# Patient Record
Sex: FEMALE | HISPANIC OR LATINO | Employment: FULL TIME | URBAN - METROPOLITAN AREA
[De-identification: names, ages, dates, MRNs, and addresses within clinical notes are randomized per-mention and may not be internally consistent; named-entity substitution may affect disease eponyms.]

---

## 2018-07-30 ENCOUNTER — TELEPHONE (OUTPATIENT)
Dept: MEDICAL GROUP | Age: 28
End: 2018-07-30

## 2018-07-30 NOTE — TELEPHONE ENCOUNTER
Phone Number Called: There are no phone numbers on file.      Message: LM for patient to call the office to reschedule NP appointment as Jenny will be out of the office on 8/10/18.     Left Message for patient to call back: yes

## 2018-07-30 NOTE — TELEPHONE ENCOUNTER
Phone Number Called: There are no phone numbers on file.      Message: Disregard previous message.    Left Message for patient to call back: no

## 2018-08-09 ENCOUNTER — TELEPHONE (OUTPATIENT)
Dept: MEDICAL GROUP | Age: 28
End: 2018-08-09

## 2018-08-09 NOTE — TELEPHONE ENCOUNTER
NEW PATIENT VISIT PRE-VISIT [id number]  1.  [employer] Patient is checked in Patient Demographics? YES    2.  Immunizations were updated in Epic using WebIZ?: Epic matches WebIZ       •  Web Iz Recommendations: Patient is up to date on all vaccines    3.  Is this appointment scheduled as a Hospital Follow-Up? No    4.  Patient is due for the following Health Maintenance Topics:   Health Maintenance Due   Topic Date Due   • PAP SMEAR  05/02/2016           5.  Reviewed/Updated the following with patient:   •   Preferred Pharmacy? YES       •   Preferred Lab? YES       •   Preferred Communication? YES       •   Allergies? YES       •   Medications? YES. Was Abstract Encounter opened and chart updated? YES       •   Social History? YES. Was Abstract Encounter opened and chart updated? YES       •   Family History (document living status of immediate family members and if + hx of cancer, diabetes, hypertension, hyperlipidemia, heart attack, stroke) YES. Was Abstract Encounter opened and chart updated? YES    6.  Updated Care Team?       •   DME Company (gait device, O2, CPAP, etc.) N\A       •   Other Specialists (eye doctor, derm, GYN, cardiology, endo, etc): N\A    7.  MDX printed for Provider? NO    8.  Patient was informed to arrive 15 min prior to their   scheduled appointment and bring in their medication bottles.

## 2018-08-10 ENCOUNTER — OFFICE VISIT (OUTPATIENT)
Dept: MEDICAL GROUP | Age: 28
End: 2018-08-10
Payer: COMMERCIAL

## 2018-08-10 VITALS
HEART RATE: 94 BPM | WEIGHT: 137.6 LBS | DIASTOLIC BLOOD PRESSURE: 58 MMHG | TEMPERATURE: 98.1 F | SYSTOLIC BLOOD PRESSURE: 100 MMHG | OXYGEN SATURATION: 99 % | BODY MASS INDEX: 24.38 KG/M2 | HEIGHT: 63 IN

## 2018-08-10 DIAGNOSIS — Z76.89 ENCOUNTER TO ESTABLISH CARE: ICD-10-CM

## 2018-08-10 DIAGNOSIS — J30.1 SEASONAL ALLERGIC RHINITIS DUE TO POLLEN: ICD-10-CM

## 2018-08-10 DIAGNOSIS — Z00.00 WELL ADULT EXAM: ICD-10-CM

## 2018-08-10 PROCEDURE — 99203 OFFICE O/P NEW LOW 30 MIN: CPT | Performed by: PHYSICIAN ASSISTANT

## 2018-08-10 ASSESSMENT — PATIENT HEALTH QUESTIONNAIRE - PHQ9: CLINICAL INTERPRETATION OF PHQ2 SCORE: 0

## 2018-08-10 NOTE — LETTER
[facility]  Jenny Fleming P.A.-C.  [address]  [place] [address]  Fax: [phone]   Authorization for Release/Disclosure of   Protected Health Information   Name: JENA TOMLINSON : 1990 SSN: [id number]   Address: [address] [place] [address] Dr  [place] [address] Phone:    [phone] (home)    I authorize the entity listed below to release/disclose the PHI below to:   [facility]/Jenny Fleming P.A.-C. and Jenny Fleming P.A.-C.   Provider or Entity Name:  Dr. Alfredo Zamudio   Address   City, [facility], [place], [place]   Phone:      Fax:     Reason for request: continuity of care   Information to be released:    [  ] LAST COLONOSCOPY,  including any PATH REPORT and follow-up  [  ] LAST FIT/COLOGUARD RESULT [  ] LAST DEXA  [  ] LAST MAMMOGRAM  [x ] LAST PAP  [  ] LAST LABS [  ] RETINA EXAM REPORT  [  ] IMMUNIZATION RECORDS  [  ] Release all info      [  ] Check here and initial the line next to each item to release ALL health information INCLUDING  _____ Care and treatment for drug and / or alcohol abuse  _____ HIV testing, infection status, or AIDS  _____ Genetic Testing    DATES OF SERVICE OR TIME PERIOD TO BE DISCLOSED: _____________  I understand and acknowledge that:  * This Authorization may be revoked at any time by you in writing, except if your health information has already been used or disclosed.  * Your health information that will be used or disclosed as a result of you signing this authorization could be re-disclosed by the recipient. If this occurs, your re-disclosed health information may no longer be protected by State or Federal laws.  * You may refuse to sign this Authorization. Your refusal will not affect your ability to obtain treatment.  * This Authorization becomes effective upon signing and will  on (date) __________.      If no date is indicated, this Authorization will  one (1) year from the signature date.    Name: Jena Brush  Juvencio    Signature:   Date:     8/10/2018       PLEASE FAX REQUESTED RECORDS BACK TO: [phone]

## 2018-08-10 NOTE — ASSESSMENT & PLAN NOTE
This is a chronic problem.  The patient uses OTC zyrtec to manage her symptoms.  Her most bothersome symptoms are rhinorrhea, sneezing, and itchy eyes.  She is bothered by the fact that the Zyrtec dries out her nose, so she uses a humidifier at night.
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
## 2018-08-18 ENCOUNTER — HOSPITAL ENCOUNTER (OUTPATIENT)
Dept: LAB | Facility: MEDICAL CENTER | Age: 28
End: 2018-08-18
Attending: PHYSICIAN ASSISTANT
Payer: COMMERCIAL

## 2018-08-18 DIAGNOSIS — Z00.00 WELL ADULT EXAM: ICD-10-CM

## 2018-08-18 LAB
ALBUMIN SERPL BCP-MCNC: 3.9 G/DL (ref 3.2–4.9)
ALBUMIN/GLOB SERPL: 1.3 G/DL
ALP SERPL-CCNC: 51 U/L (ref 30–99)
ALT SERPL-CCNC: 11 U/L (ref 2–50)
ANION GAP SERPL CALC-SCNC: 8 MMOL/L (ref 0–11.9)
AST SERPL-CCNC: 12 U/L (ref 12–45)
BASOPHILS # BLD AUTO: 0.7 % (ref 0–1.8)
BASOPHILS # BLD: 0.05 K/UL (ref 0–0.12)
BILIRUB SERPL-MCNC: 0.6 MG/DL (ref 0.1–1.5)
BUN SERPL-MCNC: 13 MG/DL (ref 8–22)
CALCIUM SERPL-MCNC: 8.7 MG/DL (ref 8.5–10.5)
CHLORIDE SERPL-SCNC: 108 MMOL/L (ref 96–112)
CHOLEST SERPL-MCNC: 146 MG/DL (ref 100–199)
CO2 SERPL-SCNC: 26 MMOL/L (ref 20–33)
CREAT SERPL-MCNC: 0.73 MG/DL (ref 0.5–1.4)
EOSINOPHIL # BLD AUTO: 0.18 K/UL (ref 0–0.51)
EOSINOPHIL NFR BLD: 2.4 % (ref 0–6.9)
ERYTHROCYTE [DISTWIDTH] IN BLOOD BY AUTOMATED COUNT: 41.5 FL (ref 35.9–50)
GLOBULIN SER CALC-MCNC: 2.9 G/DL (ref 1.9–3.5)
GLUCOSE SERPL-MCNC: 83 MG/DL (ref 65–99)
HCT VFR BLD AUTO: 41.9 % (ref 37–47)
HDLC SERPL-MCNC: 44 MG/DL
HGB BLD-MCNC: 13.6 G/DL (ref 12–16)
IMM GRANULOCYTES # BLD AUTO: 0.01 K/UL (ref 0–0.11)
IMM GRANULOCYTES NFR BLD AUTO: 0.1 % (ref 0–0.9)
LDLC SERPL CALC-MCNC: 89 MG/DL
LYMPHOCYTES # BLD AUTO: 2.84 K/UL (ref 1–4.8)
LYMPHOCYTES NFR BLD: 38.4 % (ref 22–41)
MCH RBC QN AUTO: 28.9 PG (ref 27–33)
MCHC RBC AUTO-ENTMCNC: 32.5 G/DL (ref 33.6–35)
MCV RBC AUTO: 89.1 FL (ref 81.4–97.8)
MONOCYTES # BLD AUTO: 0.5 K/UL (ref 0–0.85)
MONOCYTES NFR BLD AUTO: 6.8 % (ref 0–13.4)
NEUTROPHILS # BLD AUTO: 3.81 K/UL (ref 2–7.15)
NEUTROPHILS NFR BLD: 51.6 % (ref 44–72)
NRBC # BLD AUTO: 0 K/UL
NRBC BLD-RTO: 0 /100 WBC
PLATELET # BLD AUTO: 233 K/UL (ref 164–446)
PMV BLD AUTO: 10 FL (ref 9–12.9)
POTASSIUM SERPL-SCNC: 3.9 MMOL/L (ref 3.6–5.5)
PROT SERPL-MCNC: 6.8 G/DL (ref 6–8.2)
RBC # BLD AUTO: 4.7 M/UL (ref 4.2–5.4)
SODIUM SERPL-SCNC: 142 MMOL/L (ref 135–145)
TRIGL SERPL-MCNC: 64 MG/DL (ref 0–149)
WBC # BLD AUTO: 7.4 K/UL (ref 4.8–10.8)

## 2018-08-18 PROCEDURE — 80061 LIPID PANEL: CPT

## 2018-08-18 PROCEDURE — 85025 COMPLETE CBC W/AUTO DIFF WBC: CPT

## 2018-08-18 PROCEDURE — 80053 COMPREHEN METABOLIC PANEL: CPT

## 2018-08-18 PROCEDURE — 36415 COLL VENOUS BLD VENIPUNCTURE: CPT

## 2018-08-31 ENCOUNTER — OFFICE VISIT (OUTPATIENT)
Dept: MEDICAL GROUP | Age: 28
End: 2018-08-31
Payer: COMMERCIAL

## 2018-08-31 ENCOUNTER — HOSPITAL ENCOUNTER (OUTPATIENT)
Facility: MEDICAL CENTER | Age: 28
End: 2018-08-31
Attending: PHYSICIAN ASSISTANT
Payer: COMMERCIAL

## 2018-08-31 VITALS
DIASTOLIC BLOOD PRESSURE: 58 MMHG | OXYGEN SATURATION: 98 % | SYSTOLIC BLOOD PRESSURE: 106 MMHG | WEIGHT: 135.6 LBS | BODY MASS INDEX: 24.03 KG/M2 | HEART RATE: 98 BPM | HEIGHT: 63 IN | TEMPERATURE: 98.7 F

## 2018-08-31 DIAGNOSIS — R30.0 DYSURIA: ICD-10-CM

## 2018-08-31 DIAGNOSIS — Z01.419 WELL FEMALE EXAM WITH ROUTINE GYNECOLOGICAL EXAM: ICD-10-CM

## 2018-08-31 DIAGNOSIS — Z12.4 PAP SMEAR FOR CERVICAL CANCER SCREENING: ICD-10-CM

## 2018-08-31 DIAGNOSIS — R39.89 ABNORMAL UROGENITAL DISCHARGE: ICD-10-CM

## 2018-08-31 PROCEDURE — 99395 PREV VISIT EST AGE 18-39: CPT | Performed by: PHYSICIAN ASSISTANT

## 2018-08-31 PROCEDURE — 88175 CYTOPATH C/V AUTO FLUID REDO: CPT

## 2018-08-31 PROCEDURE — 99000 SPECIMEN HANDLING OFFICE-LAB: CPT | Performed by: PHYSICIAN ASSISTANT

## 2018-08-31 NOTE — PROGRESS NOTES
"S: Jena Henning is a 27 y.o.,female who presents today for her Pap and GYN exam. Her LMP was 2018. She is still having regular menses. Her form of contraception is tubal ligation    Abnormal urogenital discharge  This is a new problem. The patient complains of increased clear/yellowish vaginal discharge as well as an unpleasant smell of her urine. No dysuria, flank pain, hematuria, urgency or frequency.       She is , P:2.    She has not had an Abnormal Pap previously.  Her last Mammogram was done: due at 37 yo.     Her preventative health screens are up to date.  GYN ROS:  normal menses, no abnormal bleeding, pelvic pain, no breast pain or new or enlarging lumps on self exam, she complains of new abnormal discharge as in HPI.    Patient Active Problem List    Diagnosis Date Noted   • Abnormal urogenital discharge 2018   • Seasonal allergic rhinitis due to pollen 08/10/2018     No current outpatient prescriptions on file prior to visit.     No current facility-administered medications on file prior to visit.      Social History   Substance Use Topics   • Smoking status: Never Smoker   • Smokeless tobacco: Never Used   • Alcohol use Yes      Comment: occassionally       O: /58   Pulse 98   Temp 37.1 °C (98.7 °F)   Ht 1.588 m (5' 2.5\")   Wt 61.5 kg (135 lb 9.6 oz)   SpO2 98%   BMI 24.41 kg/m²   Vitals Noted and Reviewed  Vulva: grossly unremarkable, no lesions or masses noted  Vagina: yellowish discharge present, normal color  Cervix: Parous, nonfriable, no surface lesions identified, Pap was performed  Uterus: Normal shape, position and consistency, Retroverted, mobile  Bimanual exam: No uteromegaly, negative chandelier sign, adnexa freely movable and without enlargements bilaterally  Rectal: not performed    Assessment:  Abnormal GYN Exam - new unpleasant discharge  POCT UA without abnormalities today  Vaginal swab sent for pathogens, will call with results      Plan: "   pap smear  return annually or prn    Pap processed and sent to the lab.    Recommend follow up prn

## 2018-08-31 NOTE — ASSESSMENT & PLAN NOTE
This is a new problem. The patient complains of increased clear/yellowish vaginal discharge as well as an unpleasant smell of her urine. No dysuria, flank pain, hematuria, urgency or frequency.

## 2018-09-05 DIAGNOSIS — Z01.419 WELL FEMALE EXAM WITH ROUTINE GYNECOLOGICAL EXAM: ICD-10-CM

## 2018-09-05 DIAGNOSIS — Z12.4 PAP SMEAR FOR CERVICAL CANCER SCREENING: ICD-10-CM

## 2018-09-05 LAB — CYTOLOGY REG CYTOL: NORMAL

## 2018-09-10 PROBLEM — R87.619 ABNORMAL PAP SMEAR OF CERVIX: Status: ACTIVE | Noted: 2018-09-10

## 2018-12-18 ENCOUNTER — OFFICE VISIT (OUTPATIENT)
Dept: URGENT CARE | Facility: CLINIC | Age: 28
End: 2018-12-18
Payer: COMMERCIAL

## 2018-12-18 VITALS
DIASTOLIC BLOOD PRESSURE: 70 MMHG | SYSTOLIC BLOOD PRESSURE: 102 MMHG | HEIGHT: 62 IN | HEART RATE: 90 BPM | RESPIRATION RATE: 16 BRPM | BODY MASS INDEX: 24.66 KG/M2 | OXYGEN SATURATION: 95 % | TEMPERATURE: 98 F | WEIGHT: 134 LBS

## 2018-12-18 DIAGNOSIS — R11.0 NAUSEA: ICD-10-CM

## 2018-12-18 DIAGNOSIS — J40 BRONCHITIS: Primary | ICD-10-CM

## 2018-12-18 DIAGNOSIS — J06.9 URI WITH COUGH AND CONGESTION: ICD-10-CM

## 2018-12-18 DIAGNOSIS — J01.40 ACUTE NON-RECURRENT PANSINUSITIS: ICD-10-CM

## 2018-12-18 LAB
FLUAV+FLUBV AG SPEC QL IA: NORMAL
INT CON NEG: NORMAL
INT CON POS: NORMAL

## 2018-12-18 PROCEDURE — 99214 OFFICE O/P EST MOD 30 MIN: CPT | Performed by: PHYSICIAN ASSISTANT

## 2018-12-18 PROCEDURE — 87804 INFLUENZA ASSAY W/OPTIC: CPT | Performed by: PHYSICIAN ASSISTANT

## 2018-12-18 RX ORDER — AMOXICILLIN AND CLAVULANATE POTASSIUM 875; 125 MG/1; MG/1
1 TABLET, FILM COATED ORAL 2 TIMES DAILY
Qty: 14 TAB | Refills: 0 | Status: SHIPPED | OUTPATIENT
Start: 2018-12-18 | End: 2018-12-25

## 2018-12-18 RX ORDER — ONDANSETRON 4 MG/1
8 TABLET, ORALLY DISINTEGRATING ORAL ONCE
Status: COMPLETED | OUTPATIENT
Start: 2018-12-18 | End: 2018-12-18

## 2018-12-18 RX ORDER — ONDANSETRON 4 MG/1
4 TABLET, ORALLY DISINTEGRATING ORAL EVERY 8 HOURS PRN
Qty: 10 TAB | Refills: 0 | Status: SHIPPED | OUTPATIENT
Start: 2018-12-18 | End: 2018-12-21

## 2018-12-18 RX ORDER — PROMETHAZINE HYDROCHLORIDE AND CODEINE PHOSPHATE 6.25; 1 MG/5ML; MG/5ML
5 SYRUP ORAL 4 TIMES DAILY PRN
Qty: 120 ML | Refills: 0 | Status: SHIPPED | OUTPATIENT
Start: 2018-12-18 | End: 2018-12-25

## 2018-12-18 RX ORDER — METHYLPREDNISOLONE 4 MG/1
TABLET ORAL
Qty: 21 TAB | Refills: 0 | Status: SHIPPED | OUTPATIENT
Start: 2018-12-18 | End: 2019-07-02

## 2018-12-18 RX ADMIN — ONDANSETRON 8 MG: 4 TABLET, ORALLY DISINTEGRATING ORAL at 11:03
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
## 2018-12-18 NOTE — PATIENT INSTRUCTIONS
Enterovirus D68, Adult  Enterovirus D68 is a common virus that causes a fever, cough, and nasal congestion (upper respiratory infection).  What are the causes?  Enterovirus D68 spreads from person to person through coughing and sneezing. The virus is present in the fluid of an infected person's lungs (upper respiratory secretions). When a sick person coughs or sneezes, particles get released into the air. You can get infected by breathing in those particles. The virus may also be on any surface where these particles land. You can get infected by touching that surface and then touching your nose or mouth.  What increases the risk?  This condition is more likely to develop in:  · People who have a chronic disease, such as chronic obstructive pulmonary disease (COPD), asthma, congestive heart failure, cystic fibrosis, or diabetes.  · People who have a weakened immune system (are immunocompromised).  What are the signs or symptoms?  For most adults, symptoms of this condition are mild. Symptoms include:  · Fever.  · Nasal congestion.  · Sneezing.  · Muscle aches.  · Cough.  · Wheezing.  · Trouble breathing.  How is this diagnosed?  This condition is diagnosed based on your symptoms and a physical exam. You may also have a chest X-ray.  How is this treated?  There is no treatment or vaccine for this infection. Most people recover after resting at home for several days.  Follow these instructions at home:  · Take over-the-counter and prescription medicines only as told by your health care provider.  · Rest at home until symptoms go away. Do not go to work while you have symptoms.  · Wash your hands often with soap and water for at least 20 seconds. If soap and water are not available, use hand .  · Drink enough fluid to keep your urine clear or pale yellow.  · Keep all follow-up visits as told by your health care provider. This is important.  Contact a health care provider if:  · You have a fever.  · You have  nausea or vomiting.  · Your symptoms last longer than 3 days.  Get help right away if:  · You develop wheezing.  · You have trouble breathing.  · You cannot keep fluids down.  This information is not intended to replace advice given to you by your health care provider. Make sure you discuss any questions you have with your health care provider.  Document Released: 12/23/2014 Document Revised: 07/07/2017 Document Reviewed: 04/06/2017  [employer] Interactive Patient Education © 2017 [employer]

## 2018-12-18 NOTE — LETTER
December 18, 2018       Patient: Jena Henning   YOB: 1990   Date of Visit: 12/18/2018         To Whom It May Concern:    It is my medical opinion that Jena Henning may be excused from work for the dates of 12/18/18-12/23/18.      If you have any questions or concerns, please don't hesitate to call [phone]          Sincerely,          Dilan Wilkes P.A.-C.  Electronically Signed

## 2019-05-06 ENCOUNTER — TELEPHONE (OUTPATIENT)
Dept: SCHEDULING | Facility: IMAGING CENTER | Age: 29
End: 2019-05-06

## 2019-05-08 ENCOUNTER — TELEPHONE (OUTPATIENT)
Dept: SCHEDULING | Facility: IMAGING CENTER | Age: 29
End: 2019-05-08

## 2019-07-02 ENCOUNTER — OFFICE VISIT (OUTPATIENT)
Dept: MEDICAL GROUP | Facility: MEDICAL CENTER | Age: 29
End: 2019-07-02
Payer: COMMERCIAL

## 2019-07-02 VITALS
RESPIRATION RATE: 16 BRPM | HEIGHT: 62 IN | TEMPERATURE: 98.5 F | SYSTOLIC BLOOD PRESSURE: 104 MMHG | DIASTOLIC BLOOD PRESSURE: 72 MMHG | OXYGEN SATURATION: 99 % | BODY MASS INDEX: 25.56 KG/M2 | HEART RATE: 97 BPM | WEIGHT: 138.89 LBS

## 2019-07-02 DIAGNOSIS — Z76.89 ENCOUNTER TO ESTABLISH CARE: ICD-10-CM

## 2019-07-02 DIAGNOSIS — J30.1 SEASONAL ALLERGIC RHINITIS DUE TO POLLEN: ICD-10-CM

## 2019-07-02 DIAGNOSIS — R87.619 ABNORMAL CERVICAL PAPANICOLAOU SMEAR, UNSPECIFIED ABNORMAL PAP FINDING: ICD-10-CM

## 2019-07-02 DIAGNOSIS — Z00.00 PREVENTATIVE HEALTH CARE: ICD-10-CM

## 2019-07-02 PROBLEM — R39.89 ABNORMAL UROGENITAL DISCHARGE: Status: RESOLVED | Noted: 2018-08-31 | Resolved: 2019-07-02

## 2019-07-02 PROCEDURE — 99214 OFFICE O/P EST MOD 30 MIN: CPT | Performed by: PHYSICIAN ASSISTANT

## 2019-07-02 ASSESSMENT — PATIENT HEALTH QUESTIONNAIRE - PHQ9: CLINICAL INTERPRETATION OF PHQ2 SCORE: 0
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
## 2019-07-02 NOTE — ASSESSMENT & PLAN NOTE
This is a 28-year-old female who is here to care.  Has a chronic history of seasonal allergies.  Symptoms are not too bad.  She does not take medication routinely for her symptoms.

## 2019-07-02 NOTE — ASSESSMENT & PLAN NOTE
Had a Pap smear performed in August of last year.  Noted to have atypical cells.  Was advised to follow-up in 1 year to have a repeat Pap smear.  She has no concerns today with any vaginal complaints.    She does have a form that needs to be completed eventually through her work.  She did not bring the form today.  She is requesting some labs.

## 2019-08-31 ENCOUNTER — HOSPITAL ENCOUNTER (OUTPATIENT)
Dept: LAB | Facility: MEDICAL CENTER | Age: 29
End: 2019-08-31
Attending: PHYSICIAN ASSISTANT
Payer: COMMERCIAL

## 2019-08-31 DIAGNOSIS — Z00.00 PREVENTATIVE HEALTH CARE: ICD-10-CM

## 2019-08-31 LAB
ALBUMIN SERPL BCP-MCNC: 4.1 G/DL (ref 3.2–4.9)
ALBUMIN/GLOB SERPL: 1.5 G/DL
ALP SERPL-CCNC: 54 U/L (ref 30–99)
ALT SERPL-CCNC: 10 U/L (ref 2–50)
ANION GAP SERPL CALC-SCNC: 7 MMOL/L (ref 0–11.9)
AST SERPL-CCNC: 12 U/L (ref 12–45)
BASOPHILS # BLD AUTO: 0.7 % (ref 0–1.8)
BASOPHILS # BLD: 0.05 K/UL (ref 0–0.12)
BILIRUB SERPL-MCNC: 1.1 MG/DL (ref 0.1–1.5)
BUN SERPL-MCNC: 9 MG/DL (ref 8–22)
CALCIUM SERPL-MCNC: 9 MG/DL (ref 8.5–10.5)
CHLORIDE SERPL-SCNC: 107 MMOL/L (ref 96–112)
CHOLEST SERPL-MCNC: 154 MG/DL (ref 100–199)
CO2 SERPL-SCNC: 27 MMOL/L (ref 20–33)
CREAT SERPL-MCNC: 0.61 MG/DL (ref 0.5–1.4)
EOSINOPHIL # BLD AUTO: 0.11 K/UL (ref 0–0.51)
EOSINOPHIL NFR BLD: 1.6 % (ref 0–6.9)
ERYTHROCYTE [DISTWIDTH] IN BLOOD BY AUTOMATED COUNT: 43.4 FL (ref 35.9–50)
EST. AVERAGE GLUCOSE BLD GHB EST-MCNC: 105 MG/DL
GLOBULIN SER CALC-MCNC: 2.8 G/DL (ref 1.9–3.5)
GLUCOSE SERPL-MCNC: 87 MG/DL (ref 65–99)
HBA1C MFR BLD: 5.3 % (ref 0–5.6)
HCT VFR BLD AUTO: 44.4 % (ref 37–47)
HDLC SERPL-MCNC: 47 MG/DL
HGB BLD-MCNC: 13.8 G/DL (ref 12–16)
IMM GRANULOCYTES # BLD AUTO: 0.01 K/UL (ref 0–0.11)
IMM GRANULOCYTES NFR BLD AUTO: 0.1 % (ref 0–0.9)
LDLC SERPL CALC-MCNC: 90 MG/DL
LYMPHOCYTES # BLD AUTO: 2.14 K/UL (ref 1–4.8)
LYMPHOCYTES NFR BLD: 31.8 % (ref 22–41)
MCH RBC QN AUTO: 28.8 PG (ref 27–33)
MCHC RBC AUTO-ENTMCNC: 31.1 G/DL (ref 33.6–35)
MCV RBC AUTO: 92.7 FL (ref 81.4–97.8)
MONOCYTES # BLD AUTO: 0.48 K/UL (ref 0–0.85)
MONOCYTES NFR BLD AUTO: 7.1 % (ref 0–13.4)
NEUTROPHILS # BLD AUTO: 3.93 K/UL (ref 2–7.15)
NEUTROPHILS NFR BLD: 58.7 % (ref 44–72)
NRBC # BLD AUTO: 0 K/UL
NRBC BLD-RTO: 0 /100 WBC
PLATELET # BLD AUTO: 226 K/UL (ref 164–446)
PMV BLD AUTO: 9.9 FL (ref 9–12.9)
POTASSIUM SERPL-SCNC: 3.8 MMOL/L (ref 3.6–5.5)
PROT SERPL-MCNC: 6.9 G/DL (ref 6–8.2)
RBC # BLD AUTO: 4.79 M/UL (ref 4.2–5.4)
SODIUM SERPL-SCNC: 141 MMOL/L (ref 135–145)
TRIGL SERPL-MCNC: 83 MG/DL (ref 0–149)
WBC # BLD AUTO: 6.7 K/UL (ref 4.8–10.8)

## 2019-08-31 PROCEDURE — 80053 COMPREHEN METABOLIC PANEL: CPT

## 2019-08-31 PROCEDURE — 80061 LIPID PANEL: CPT

## 2019-08-31 PROCEDURE — 85025 COMPLETE CBC W/AUTO DIFF WBC: CPT

## 2019-08-31 PROCEDURE — 36415 COLL VENOUS BLD VENIPUNCTURE: CPT

## 2019-08-31 PROCEDURE — 83036 HEMOGLOBIN GLYCOSYLATED A1C: CPT

## 2019-09-24 ENCOUNTER — APPOINTMENT (OUTPATIENT)
Dept: MEDICAL GROUP | Facility: MEDICAL CENTER | Age: 29
End: 2019-09-24
Payer: COMMERCIAL

## 2019-10-21 ENCOUNTER — OFFICE VISIT (OUTPATIENT)
Dept: MEDICAL GROUP | Facility: MEDICAL CENTER | Age: 29
End: 2019-10-21
Payer: COMMERCIAL

## 2019-10-21 VITALS
BODY MASS INDEX: 26.13 KG/M2 | HEART RATE: 88 BPM | WEIGHT: 142 LBS | OXYGEN SATURATION: 96 % | HEIGHT: 62 IN | RESPIRATION RATE: 16 BRPM | DIASTOLIC BLOOD PRESSURE: 68 MMHG | SYSTOLIC BLOOD PRESSURE: 110 MMHG | TEMPERATURE: 99 F

## 2019-10-21 DIAGNOSIS — Z00.00 ANNUAL PHYSICAL EXAM: ICD-10-CM

## 2019-10-21 PROCEDURE — 99395 PREV VISIT EST AGE 18-39: CPT | Performed by: PHYSICIAN ASSISTANT

## 2019-10-21 NOTE — ASSESSMENT & PLAN NOTE
This is a 29-year-old female who is here today for a physical.  Has a form which is simple to complete.  Requires vitals and some labs.  Labs were done at the end of August.  They were normal ranges.  Requesting glucose as well as cholesterol.  She is overdue for her Pap smear.  Last Pap smear was abnormal.  She follows with a gynecologist outside of [facility].  She will plan on making an appointment in the near future.  No new complaints today.  Went to [place] in [place] over the summer.
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
## 2020-06-22 ENCOUNTER — TELEPHONE (OUTPATIENT)
Dept: HEALTH INFORMATION MANAGEMENT | Facility: OTHER | Age: 30
End: 2020-06-22

## 2020-06-22 NOTE — TELEPHONE ENCOUNTER
1. Caller Name: self                Call Back Number: cell  [facility] PCP or Specialty Provider: Yes Jose Hawkins        2.  In the last two weeks, has the patient had any new or worsening symptoms (not explained by alternative diagnosis)? Yes, the patient reports the following COVID-19 consistent symptoms: cough, sore throat, congestion or runny nose, fatigue, headache, nausea and symptoms about 2 days.    3.  Does patient have any comoribidities? None     4.  Has the patient traveled in the last 14 days OR had any known contact with someone who is suspected or confirmed to have COVID-19?  Yes, place of employment positive    5. POTENTIAL PUI (LOW): Advised to perform self care, monitor for worsening symptoms and to call back in 3 days if no improvement. Instructed to self isolate and contact [facility] at [phone]     patient had incorrect number for health department, updated patient that she could either get an order from MD or employer to get done at our urgent care or call [facility]. Patient was irate for being transferred to triage line and hung up.    Note routed to [facility] Provider: PIERCE only.

## 2020-06-23 DIAGNOSIS — Z00.00 PREVENTATIVE HEALTH CARE: ICD-10-CM

## 2020-11-09 ENCOUNTER — HOSPITAL ENCOUNTER (OUTPATIENT)
Dept: LAB | Facility: MEDICAL CENTER | Age: 30
End: 2020-11-09
Attending: PHYSICIAN ASSISTANT
Payer: COMMERCIAL

## 2020-11-09 DIAGNOSIS — Z20.822 CLOSE EXPOSURE TO COVID-19 VIRUS: ICD-10-CM

## 2020-11-09 PROCEDURE — U0003 INFECTIOUS AGENT DETECTION BY NUCLEIC ACID (DNA OR RNA); SEVERE ACUTE RESPIRATORY SYNDROME CORONAVIRUS 2 (SARS-COV-2) (CORONAVIRUS DISEASE [COVID-19]), AMPLIFIED PROBE TECHNIQUE, MAKING USE OF HIGH THROUGHPUT TECHNOLOGIES AS DESCRIBED BY CMS-2020-01-R: HCPCS

## 2020-11-09 PROCEDURE — C9803 HOPD COVID-19 SPEC COLLECT: HCPCS

## 2020-11-10 ENCOUNTER — APPOINTMENT (OUTPATIENT)
Dept: MEDICAL GROUP | Facility: MEDICAL CENTER | Age: 30
End: 2020-11-10
Payer: COMMERCIAL

## 2020-11-10 LAB
COVID ORDER STATUS COVID19: NORMAL
SARS-COV-2 RNA RESP QL NAA+PROBE: NOTDETECTED
SPECIMEN SOURCE: NORMAL

## 2021-12-14 ENCOUNTER — HOSPITAL ENCOUNTER (OUTPATIENT)
Facility: MEDICAL CENTER | Age: 31
End: 2021-12-14
Attending: NURSE PRACTITIONER
Payer: COMMERCIAL

## 2021-12-14 ENCOUNTER — OFFICE VISIT (OUTPATIENT)
Dept: URGENT CARE | Facility: PHYSICIAN GROUP | Age: 31
End: 2021-12-14
Payer: COMMERCIAL

## 2021-12-14 VITALS
WEIGHT: 135 LBS | DIASTOLIC BLOOD PRESSURE: 80 MMHG | SYSTOLIC BLOOD PRESSURE: 108 MMHG | BODY MASS INDEX: 24.84 KG/M2 | HEIGHT: 62 IN | RESPIRATION RATE: 14 BRPM | HEART RATE: 100 BPM | OXYGEN SATURATION: 100 % | TEMPERATURE: 99.2 F

## 2021-12-14 DIAGNOSIS — R13.10 PAIN WITH SWALLOWING: ICD-10-CM

## 2021-12-14 DIAGNOSIS — J02.9 SORE THROAT: ICD-10-CM

## 2021-12-14 DIAGNOSIS — G44.89 OTHER HEADACHE SYNDROME: ICD-10-CM

## 2021-12-14 DIAGNOSIS — R50.9 LOW GRADE FEVER: ICD-10-CM

## 2021-12-14 DIAGNOSIS — H57.13 EYE PAIN, BILATERAL: ICD-10-CM

## 2021-12-14 LAB
INT CON NEG: NEGATIVE
INT CON POS: NORMAL
S PYO AG THROAT QL: NORMAL

## 2021-12-14 PROCEDURE — 99213 OFFICE O/P EST LOW 20 MIN: CPT | Performed by: NURSE PRACTITIONER

## 2021-12-14 PROCEDURE — 87880 STREP A ASSAY W/OPTIC: CPT | Performed by: NURSE PRACTITIONER

## 2021-12-14 PROCEDURE — 87077 CULTURE AEROBIC IDENTIFY: CPT

## 2021-12-14 PROCEDURE — 87070 CULTURE OTHR SPECIMN AEROBIC: CPT

## 2021-12-14 RX ORDER — IBUPROFEN 200 MG
200 TABLET ORAL EVERY 6 HOURS PRN
COMMUNITY
End: 2023-03-07

## 2021-12-14 ASSESSMENT — ENCOUNTER SYMPTOMS
RESPIRATORY NEGATIVE: 1
EYE PAIN: 1
PSYCHIATRIC NEGATIVE: 1
CARDIOVASCULAR NEGATIVE: 1
CONSTITUTIONAL NEGATIVE: 1
HEADACHES: 1
SORE THROAT: 1
MUSCULOSKELETAL NEGATIVE: 1
GASTROINTESTINAL NEGATIVE: 1

## 2021-12-15 DIAGNOSIS — R13.10 PAIN WITH SWALLOWING: ICD-10-CM

## 2021-12-15 DIAGNOSIS — G44.89 OTHER HEADACHE SYNDROME: ICD-10-CM

## 2021-12-15 DIAGNOSIS — J02.9 SORE THROAT: ICD-10-CM

## 2021-12-15 DIAGNOSIS — R50.9 LOW GRADE FEVER: ICD-10-CM

## 2021-12-15 DIAGNOSIS — H57.13 EYE PAIN, BILATERAL: ICD-10-CM

## 2021-12-15 NOTE — PROGRESS NOTES
"Subjective:   Jena Henning is a 31 y.o. female who presents for Oral Swelling (difficulty swallowing, headaches, x 3 days)       HPI  Pt presents for evaluation of a new problem, reports a 3-day history of difficulty swallowing, feeling as though is a piece of bread stuck in her throat, headache, eye pressure and pain, and ear fullness. She has not tried anything for her symptoms. Patient has a history of allergic rhinitis, does not take allergy medicines on a daily basis. Patient denies any known ill contacts, reports having Covid in August. Is not Covid vaccinated.      Review of Systems   Constitutional: Negative.    HENT: Positive for congestion and sore throat.    Eyes: Positive for pain.   Respiratory: Negative.    Cardiovascular: Negative.    Gastrointestinal: Negative.    Genitourinary: Negative.    Musculoskeletal: Negative.    Skin: Negative.    Neurological: Positive for headaches.   Endo/Heme/Allergies: Positive for environmental allergies.   Psychiatric/Behavioral: Negative.    All other systems reviewed and are negative.      MEDS:   Current Outpatient Medications:   •  ibuprofen (MOTRIN) 200 MG Tab, Take 200 mg by mouth every 6 hours as needed., Disp: , Rfl:   ALLERGIES: No Known Allergies    Patient's PMH, SocHx, SurgHx, FamHx, Drug allergies and medications were reviewed.     Objective:   /80   Pulse 100   Temp 37.3 °C (99.2 °F)   Resp 14   Ht 1.575 m (5' 2\")   Wt 61.2 kg (135 lb)   SpO2 100%   BMI 24.69 kg/m²     Physical Exam  Vitals and nursing note reviewed.   Constitutional:       General: She is awake.      Appearance: Normal appearance. She is well-developed and normal weight.   HENT:      Head: Normocephalic and atraumatic.      Right Ear: Tympanic membrane, ear canal and external ear normal.      Left Ear: Tympanic membrane, ear canal and external ear normal.      Nose: Nose normal.      Mouth/Throat:      Lips: Pink.      Mouth: Mucous membranes are moist. "      Pharynx: Oropharynx is clear. Uvula midline. Posterior oropharyngeal erythema (mild) present.   Eyes:      Extraocular Movements: Extraocular movements intact.      Conjunctiva/sclera: Conjunctivae normal.      Pupils: Pupils are equal, round, and reactive to light.   Neck:      Thyroid: No thyromegaly.      Trachea: Trachea normal.   Cardiovascular:      Rate and Rhythm: Normal rate and regular rhythm.      Pulses: Normal pulses.      Heart sounds: Normal heart sounds, S1 normal and S2 normal.   Pulmonary:      Effort: Pulmonary effort is normal. No respiratory distress.      Breath sounds: Normal breath sounds. No wheezing, rhonchi or rales.   Abdominal:      General: Bowel sounds are normal.      Palpations: Abdomen is soft.   Musculoskeletal:         General: Normal range of motion.      Cervical back: Full passive range of motion without pain, normal range of motion and neck supple.   Lymphadenopathy:      Cervical: No cervical adenopathy.   Skin:     General: Skin is warm and dry.      Capillary Refill: Capillary refill takes less than 2 seconds.   Neurological:      General: No focal deficit present.      Mental Status: She is alert and oriented to person, place, and time.      Gait: Gait is intact.   Psychiatric:         Attention and Perception: Attention and perception normal.         Mood and Affect: Mood normal.         Speech: Speech normal.         Behavior: Behavior normal. Behavior is cooperative.         Thought Content: Thought content normal.         Judgment: Judgment normal.         Assessment/Plan:   Assessment    1. Sore throat  - POCT Rapid Strep A-  negative  - CULTURE THROAT; Future    2. Pain with swallowing  - POCT Rapid Strep A  - CULTURE THROAT; Future    3. Other headache syndrome  - POCT Rapid Strep A  - CULTURE THROAT; Future    4. Low grade fever  - POCT Rapid Strep A  - CULTURE THROAT; Future    5. Eye pain, bilateral  - POCT Rapid Strep A  - CULTURE THROAT; Future    Vital  signs stable at today's acute urgent care visit. Reviewed test results that were completed in the clinic. Will obtain throat culture. Patient's findings are not indicative of bacterial affection at this point in time. Recommend patient try Mucinex and antihistamine as well as alternating OTC NSAIDs as needed. Discussed management options (risks, benefits, and alternatives to treatment).     Advised the patient to follow-up with the primary care provider for recheck, reevaluation, and/or consideration of further management if necessary. Return to urgent care with any worsening symptoms or if there is no improvement in their current condition. Red flags discussed and indications to immediately call 911 or present to the ED.  All questions were encouraged and answered to the patient's satisfaction and understanding, and they agree to the plan of care.     I personally reviewed prior external notes and test results pertinent to today's visit.  I have independently reviewed and interpreted all diagnostics ordered during this urgent care acute visit. Time spent evaluating this patient was a minimum of 30 minutes and includes preparing for visit, counseling/education, exam, evaluation, obtaining history, and ordering lab/test/procedures.      Please note that this dictation was created using voice recognition software. I have made a reasonable attempt to correct obvious errors, but I expect that there are errors of grammar and possibly content that I did not discover before finalizing the note.

## 2021-12-17 LAB
BACTERIA SPEC RESP CULT: ABNORMAL
BACTERIA SPEC RESP CULT: ABNORMAL
SIGNIFICANT IND 70042: ABNORMAL
SITE SITE: ABNORMAL
SOURCE SOURCE: ABNORMAL

## 2023-01-18 ENCOUNTER — TELEPHONE (OUTPATIENT)
Dept: SCHEDULING | Facility: IMAGING CENTER | Age: 33
End: 2023-01-18

## 2023-01-23 SDOH — ECONOMIC STABILITY: FOOD INSECURITY: WITHIN THE PAST 12 MONTHS, THE FOOD YOU BOUGHT JUST DIDN'T LAST AND YOU DIDN'T HAVE MONEY TO GET MORE.: PATIENT DECLINED

## 2023-01-23 SDOH — ECONOMIC STABILITY: HOUSING INSECURITY
IN THE LAST 12 MONTHS, WAS THERE A TIME WHEN YOU DID NOT HAVE A STEADY PLACE TO SLEEP OR SLEPT IN A SHELTER (INCLUDING NOW)?: NO

## 2023-01-23 SDOH — ECONOMIC STABILITY: INCOME INSECURITY: HOW HARD IS IT FOR YOU TO PAY FOR THE VERY BASICS LIKE FOOD, HOUSING, MEDICAL CARE, AND HEATING?: PATIENT DECLINED

## 2023-01-23 SDOH — HEALTH STABILITY: PHYSICAL HEALTH: ON AVERAGE, HOW MANY MINUTES DO YOU ENGAGE IN EXERCISE AT THIS LEVEL?: 0 MIN

## 2023-01-23 SDOH — ECONOMIC STABILITY: FOOD INSECURITY: WITHIN THE PAST 12 MONTHS, YOU WORRIED THAT YOUR FOOD WOULD RUN OUT BEFORE YOU GOT MONEY TO BUY MORE.: PATIENT DECLINED

## 2023-01-23 SDOH — HEALTH STABILITY: PHYSICAL HEALTH: ON AVERAGE, HOW MANY DAYS PER WEEK DO YOU ENGAGE IN MODERATE TO STRENUOUS EXERCISE (LIKE A BRISK WALK)?: 0 DAYS

## 2023-01-23 SDOH — ECONOMIC STABILITY: TRANSPORTATION INSECURITY
IN THE PAST 12 MONTHS, HAS LACK OF TRANSPORTATION KEPT YOU FROM MEETINGS, WORK, OR FROM GETTING THINGS NEEDED FOR DAILY LIVING?: NO

## 2023-01-23 SDOH — HEALTH STABILITY: MENTAL HEALTH
STRESS IS WHEN SOMEONE FEELS TENSE, NERVOUS, ANXIOUS, OR CAN'T SLEEP AT NIGHT BECAUSE THEIR MIND IS TROUBLED. HOW STRESSED ARE YOU?: NOT AT ALL

## 2023-01-23 SDOH — ECONOMIC STABILITY: INCOME INSECURITY: IN THE LAST 12 MONTHS, WAS THERE A TIME WHEN YOU WERE NOT ABLE TO PAY THE MORTGAGE OR RENT ON TIME?: NO

## 2023-01-23 SDOH — ECONOMIC STABILITY: TRANSPORTATION INSECURITY
IN THE PAST 12 MONTHS, HAS THE LACK OF TRANSPORTATION KEPT YOU FROM MEDICAL APPOINTMENTS OR FROM GETTING MEDICATIONS?: NO

## 2023-01-23 SDOH — ECONOMIC STABILITY: TRANSPORTATION INSECURITY
IN THE PAST 12 MONTHS, HAS LACK OF RELIABLE TRANSPORTATION KEPT YOU FROM MEDICAL APPOINTMENTS, MEETINGS, WORK OR FROM GETTING THINGS NEEDED FOR DAILY LIVING?: NO

## 2023-01-23 SDOH — ECONOMIC STABILITY: HOUSING INSECURITY: IN THE LAST 12 MONTHS, HOW MANY PLACES HAVE YOU LIVED?: 1

## 2023-01-23 ASSESSMENT — SOCIAL DETERMINANTS OF HEALTH (SDOH)
WITHIN THE PAST 12 MONTHS, YOU WORRIED THAT YOUR FOOD WOULD RUN OUT BEFORE YOU GOT THE MONEY TO BUY MORE: PATIENT DECLINED
HOW OFTEN DO YOU ATTEND CHURCH OR RELIGIOUS SERVICES?: NEVER
HOW OFTEN DO YOU GET TOGETHER WITH FRIENDS OR RELATIVES?: ONCE A WEEK
HOW OFTEN DO YOU HAVE SIX OR MORE DRINKS ON ONE OCCASION: PATIENT DECLINED
DO YOU BELONG TO ANY CLUBS OR ORGANIZATIONS SUCH AS CHURCH GROUPS UNIONS, FRATERNAL OR ATHLETIC GROUPS, OR SCHOOL GROUPS?: NO
HOW OFTEN DO YOU ATTENT MEETINGS OF THE CLUB OR ORGANIZATION YOU BELONG TO?: NEVER
HOW OFTEN DO YOU ATTEND CHURCH OR RELIGIOUS SERVICES?: NEVER
IN A TYPICAL WEEK, HOW MANY TIMES DO YOU TALK ON THE PHONE WITH FAMILY, FRIENDS, OR NEIGHBORS?: NEVER
HOW OFTEN DO YOU GET TOGETHER WITH FRIENDS OR RELATIVES?: ONCE A WEEK
HOW HARD IS IT FOR YOU TO PAY FOR THE VERY BASICS LIKE FOOD, HOUSING, MEDICAL CARE, AND HEATING?: PATIENT DECLINED
DO YOU BELONG TO ANY CLUBS OR ORGANIZATIONS SUCH AS CHURCH GROUPS UNIONS, FRATERNAL OR ATHLETIC GROUPS, OR SCHOOL GROUPS?: NO
IN A TYPICAL WEEK, HOW MANY TIMES DO YOU TALK ON THE PHONE WITH FAMILY, FRIENDS, OR NEIGHBORS?: NEVER
HOW OFTEN DO YOU ATTENT MEETINGS OF THE CLUB OR ORGANIZATION YOU BELONG TO?: NEVER
HOW OFTEN DO YOU HAVE A DRINK CONTAINING ALCOHOL: PATIENT DECLINED
HOW MANY DRINKS CONTAINING ALCOHOL DO YOU HAVE ON A TYPICAL DAY WHEN YOU ARE DRINKING: PATIENT DECLINED

## 2023-01-23 ASSESSMENT — LIFESTYLE VARIABLES
AUDIT-C TOTAL SCORE: -1
SKIP TO QUESTIONS 9-10: 0
HOW MANY STANDARD DRINKS CONTAINING ALCOHOL DO YOU HAVE ON A TYPICAL DAY: PATIENT DECLINED
HOW OFTEN DO YOU HAVE A DRINK CONTAINING ALCOHOL: PATIENT DECLINED
HOW OFTEN DO YOU HAVE SIX OR MORE DRINKS ON ONE OCCASION: PATIENT DECLINED

## 2023-01-24 ENCOUNTER — OFFICE VISIT (OUTPATIENT)
Dept: MEDICAL GROUP | Facility: PHYSICIAN GROUP | Age: 33
End: 2023-01-24
Payer: COMMERCIAL

## 2023-01-24 ENCOUNTER — HOSPITAL ENCOUNTER (OUTPATIENT)
Facility: MEDICAL CENTER | Age: 33
End: 2023-01-24
Payer: COMMERCIAL

## 2023-01-24 VITALS
OXYGEN SATURATION: 100 % | WEIGHT: 150 LBS | DIASTOLIC BLOOD PRESSURE: 74 MMHG | SYSTOLIC BLOOD PRESSURE: 118 MMHG | BODY MASS INDEX: 27.6 KG/M2 | HEIGHT: 62 IN | RESPIRATION RATE: 18 BRPM | HEART RATE: 90 BPM | TEMPERATURE: 98.3 F

## 2023-01-24 DIAGNOSIS — G89.29 CHRONIC RIGHT-SIDED THORACIC BACK PAIN: ICD-10-CM

## 2023-01-24 DIAGNOSIS — Z11.59 NEED FOR HEPATITIS C SCREENING TEST: ICD-10-CM

## 2023-01-24 DIAGNOSIS — M54.6 CHRONIC RIGHT-SIDED THORACIC BACK PAIN: ICD-10-CM

## 2023-01-24 DIAGNOSIS — Z13.79 GENETIC SCREENING: ICD-10-CM

## 2023-01-24 DIAGNOSIS — Z11.3 SCREENING EXAMINATION FOR SEXUALLY TRANSMITTED DISEASE: ICD-10-CM

## 2023-01-24 DIAGNOSIS — Z00.00 WELLNESS EXAMINATION: ICD-10-CM

## 2023-01-24 PROCEDURE — 99213 OFFICE O/P EST LOW 20 MIN: CPT | Mod: 25

## 2023-01-24 PROCEDURE — 87591 N.GONORRHOEAE DNA AMP PROB: CPT

## 2023-01-24 PROCEDURE — 87491 CHLMYD TRACH DNA AMP PROBE: CPT

## 2023-01-24 PROCEDURE — 99395 PREV VISIT EST AGE 18-39: CPT

## 2023-01-24 ASSESSMENT — ENCOUNTER SYMPTOMS
TINGLING: 0
WEAKNESS: 0
BACK PAIN: 1

## 2023-01-24 ASSESSMENT — PATIENT HEALTH QUESTIONNAIRE - PHQ9: CLINICAL INTERPRETATION OF PHQ2 SCORE: 0
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
## 2023-01-24 NOTE — PATIENT INSTRUCTIONS
Peter Nathan:   [facility]  Dr. Fredi Lozano  [address], [address]  [place] [address]  Phone: [phone]
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
## 2023-01-25 DIAGNOSIS — Z11.3 SCREENING EXAMINATION FOR SEXUALLY TRANSMITTED DISEASE: ICD-10-CM

## 2023-01-25 LAB
C TRACH DNA GENITAL QL NAA+PROBE: POSITIVE
N GONORRHOEA DNA GENITAL QL NAA+PROBE: NEGATIVE
SPECIMEN SOURCE: ABNORMAL

## 2023-01-26 DIAGNOSIS — A74.9 CHLAMYDIA INFECTION: ICD-10-CM

## 2023-01-26 RX ORDER — DOXYCYCLINE HYCLATE 100 MG
100 TABLET ORAL 2 TIMES DAILY
Qty: 14 TABLET | Refills: 0 | Status: SHIPPED | OUTPATIENT
Start: 2023-01-26 | End: 2023-01-27 | Stop reason: SDUPTHER

## 2023-01-27 DIAGNOSIS — A74.9 CHLAMYDIA INFECTION: ICD-10-CM

## 2023-01-27 RX ORDER — DOXYCYCLINE HYCLATE 100 MG
100 TABLET ORAL 2 TIMES DAILY
Qty: 14 TABLET | Refills: 0 | Status: SHIPPED | OUTPATIENT
Start: 2023-01-27 | End: 2023-03-07

## 2023-01-31 ENCOUNTER — HOSPITAL ENCOUNTER (OUTPATIENT)
Dept: LAB | Facility: MEDICAL CENTER | Age: 33
End: 2023-01-31
Payer: COMMERCIAL

## 2023-01-31 DIAGNOSIS — Z11.59 NEED FOR HEPATITIS C SCREENING TEST: ICD-10-CM

## 2023-01-31 DIAGNOSIS — Z11.3 SCREENING EXAMINATION FOR SEXUALLY TRANSMITTED DISEASE: ICD-10-CM

## 2023-01-31 DIAGNOSIS — Z00.00 WELLNESS EXAMINATION: ICD-10-CM

## 2023-01-31 LAB
25(OH)D3 SERPL-MCNC: 17 NG/ML (ref 30–100)
ALBUMIN SERPL BCP-MCNC: 4.1 G/DL (ref 3.2–4.9)
ALBUMIN/GLOB SERPL: 1.3 G/DL
ALP SERPL-CCNC: 64 U/L (ref 30–99)
ALT SERPL-CCNC: 10 U/L (ref 2–50)
ANION GAP SERPL CALC-SCNC: 9 MMOL/L (ref 7–16)
AST SERPL-CCNC: 13 U/L (ref 12–45)
BILIRUB SERPL-MCNC: 0.9 MG/DL (ref 0.1–1.5)
BUN SERPL-MCNC: 8 MG/DL (ref 8–22)
CALCIUM ALBUM COR SERPL-MCNC: 8.8 MG/DL (ref 8.5–10.5)
CALCIUM SERPL-MCNC: 8.9 MG/DL (ref 8.5–10.5)
CHLORIDE SERPL-SCNC: 107 MMOL/L (ref 96–112)
CHOLEST SERPL-MCNC: 173 MG/DL (ref 100–199)
CO2 SERPL-SCNC: 25 MMOL/L (ref 20–33)
CREAT SERPL-MCNC: 0.55 MG/DL (ref 0.5–1.4)
ERYTHROCYTE [DISTWIDTH] IN BLOOD BY AUTOMATED COUNT: 43.8 FL (ref 35.9–50)
EST. AVERAGE GLUCOSE BLD GHB EST-MCNC: 105 MG/DL
GFR SERPLBLD CREATININE-BSD FMLA CKD-EPI: 123 ML/MIN/1.73 M 2
GLOBULIN SER CALC-MCNC: 3.2 G/DL (ref 1.9–3.5)
GLUCOSE SERPL-MCNC: 87 MG/DL (ref 65–99)
HBA1C MFR BLD: 5.3 % (ref 4–5.6)
HBV CORE AB SERPL QL IA: NONREACTIVE
HBV SURFACE AB SERPL IA-ACNC: <3.5 MIU/ML (ref 0–10)
HBV SURFACE AG SER QL: NORMAL
HCT VFR BLD AUTO: 42.6 % (ref 37–47)
HCV AB SER QL: NORMAL
HCV AB SER QL: NORMAL
HDLC SERPL-MCNC: 51 MG/DL
HGB BLD-MCNC: 13.5 G/DL (ref 12–16)
HIV 1+2 AB+HIV1 P24 AG SERPL QL IA: NORMAL
LDLC SERPL CALC-MCNC: 106 MG/DL
MCH RBC QN AUTO: 29.3 PG (ref 27–33)
MCHC RBC AUTO-ENTMCNC: 31.7 G/DL (ref 33.6–35)
MCV RBC AUTO: 92.6 FL (ref 81.4–97.8)
PLATELET # BLD AUTO: 220 K/UL (ref 164–446)
PMV BLD AUTO: 10.2 FL (ref 9–12.9)
POTASSIUM SERPL-SCNC: 3.8 MMOL/L (ref 3.6–5.5)
PROT SERPL-MCNC: 7.3 G/DL (ref 6–8.2)
RBC # BLD AUTO: 4.6 M/UL (ref 4.2–5.4)
SODIUM SERPL-SCNC: 141 MMOL/L (ref 135–145)
T PALLIDUM AB SER QL IA: NORMAL
TRIGL SERPL-MCNC: 82 MG/DL (ref 0–149)
TSH SERPL DL<=0.005 MIU/L-ACNC: 1.21 UIU/ML (ref 0.38–5.33)
WBC # BLD AUTO: 6.5 K/UL (ref 4.8–10.8)

## 2023-01-31 PROCEDURE — 86803 HEPATITIS C AB TEST: CPT

## 2023-01-31 PROCEDURE — 87389 HIV-1 AG W/HIV-1&-2 AB AG IA: CPT

## 2023-01-31 PROCEDURE — 86780 TREPONEMA PALLIDUM: CPT

## 2023-01-31 PROCEDURE — 85027 COMPLETE CBC AUTOMATED: CPT

## 2023-01-31 PROCEDURE — 82306 VITAMIN D 25 HYDROXY: CPT

## 2023-01-31 PROCEDURE — 87340 HEPATITIS B SURFACE AG IA: CPT

## 2023-01-31 PROCEDURE — 80061 LIPID PANEL: CPT

## 2023-01-31 PROCEDURE — 84443 ASSAY THYROID STIM HORMONE: CPT

## 2023-01-31 PROCEDURE — 36415 COLL VENOUS BLD VENIPUNCTURE: CPT

## 2023-01-31 PROCEDURE — 83036 HEMOGLOBIN GLYCOSYLATED A1C: CPT

## 2023-01-31 PROCEDURE — 86706 HEP B SURFACE ANTIBODY: CPT

## 2023-01-31 PROCEDURE — 80053 COMPREHEN METABOLIC PANEL: CPT

## 2023-01-31 PROCEDURE — 86704 HEP B CORE ANTIBODY TOTAL: CPT

## 2023-02-06 ENCOUNTER — RESEARCH ENCOUNTER (OUTPATIENT)
Dept: MEDICAL GROUP | Facility: PHYSICIAN GROUP | Age: 33
End: 2023-02-06
Payer: COMMERCIAL

## 2023-02-06 DIAGNOSIS — Z00.6 RESEARCH STUDY PATIENT: ICD-10-CM

## 2023-02-21 ENCOUNTER — HOSPITAL ENCOUNTER (OUTPATIENT)
Facility: MEDICAL CENTER | Age: 33
End: 2023-02-21
Payer: COMMERCIAL

## 2023-02-21 ENCOUNTER — OFFICE VISIT (OUTPATIENT)
Dept: MEDICAL GROUP | Facility: PHYSICIAN GROUP | Age: 33
End: 2023-02-21
Payer: COMMERCIAL

## 2023-02-21 VITALS
HEART RATE: 107 BPM | WEIGHT: 151 LBS | TEMPERATURE: 97.5 F | OXYGEN SATURATION: 99 % | HEIGHT: 62 IN | DIASTOLIC BLOOD PRESSURE: 76 MMHG | BODY MASS INDEX: 27.79 KG/M2 | SYSTOLIC BLOOD PRESSURE: 118 MMHG

## 2023-02-21 DIAGNOSIS — E78.00 PURE HYPERCHOLESTEROLEMIA: ICD-10-CM

## 2023-02-21 DIAGNOSIS — E55.9 VITAMIN D DEFICIENCY: ICD-10-CM

## 2023-02-21 DIAGNOSIS — Z11.51 SCREENING FOR HPV (HUMAN PAPILLOMAVIRUS): ICD-10-CM

## 2023-02-21 DIAGNOSIS — A74.9 CHLAMYDIA INFECTION: ICD-10-CM

## 2023-02-21 DIAGNOSIS — Z12.4 SCREENING FOR CERVICAL CANCER: ICD-10-CM

## 2023-02-21 PROCEDURE — 87591 N.GONORRHOEAE DNA AMP PROB: CPT

## 2023-02-21 PROCEDURE — 99214 OFFICE O/P EST MOD 30 MIN: CPT

## 2023-02-21 PROCEDURE — 87624 HPV HI-RISK TYP POOLED RSLT: CPT

## 2023-02-21 PROCEDURE — 88175 CYTOPATH C/V AUTO FLUID REDO: CPT

## 2023-02-21 PROCEDURE — 87491 CHLMYD TRACH DNA AMP PROBE: CPT

## 2023-02-21 RX ORDER — ERGOCALCIFEROL 1.25 MG/1
50000 CAPSULE ORAL
Qty: 12 CAPSULE | Refills: 0 | Status: SHIPPED | OUTPATIENT
Start: 2023-02-21 | End: 2023-03-07

## 2023-02-21 ASSESSMENT — FIBROSIS 4 INDEX: FIB4 SCORE: 0.64

## 2023-02-22 DIAGNOSIS — Z12.4 SCREENING FOR CERVICAL CANCER: ICD-10-CM

## 2023-02-22 DIAGNOSIS — Z11.51 SCREENING FOR HPV (HUMAN PAPILLOMAVIRUS): ICD-10-CM

## 2023-02-22 LAB
C TRACH DNA GENITAL QL NAA+PROBE: NEGATIVE
N GONORRHOEA DNA GENITAL QL NAA+PROBE: NEGATIVE
SPECIMEN SOURCE: NORMAL

## 2023-02-22 NOTE — ASSESSMENT & PLAN NOTE
Chronic, unstable. Will provide ergocalciferol 92331 iu once weekly for 12 weeks, followed by once daily vit d3 otc paired with calcium to optimize absorption.

## 2023-02-22 NOTE — ASSESSMENT & PLAN NOTE
Chronic, unstable. Discussed healthy lifestyle recommendations.   The ASCVD Risk score (Jovana MCGRAW, et al., 2019) failed to calculate.
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
## 2023-02-23 LAB
CYTOLOGY REG CYTOL: ABNORMAL
HPV HR 12 DNA CVX QL NAA+PROBE: POSITIVE
HPV16 DNA SPEC QL NAA+PROBE: NEGATIVE
HPV18 DNA SPEC QL NAA+PROBE: POSITIVE
SPECIMEN SOURCE: ABNORMAL

## 2023-02-27 DIAGNOSIS — R87.810 CERVICAL HIGH RISK HPV (HUMAN PAPILLOMAVIRUS) TEST POSITIVE: ICD-10-CM

## 2023-02-28 NOTE — PROGRESS NOTES
Positive HPV 18 and other high risk hpv, recommend colposcopy per asccp recommendations, referral placed for gynecology.

## 2023-03-07 ENCOUNTER — APPOINTMENT (OUTPATIENT)
Dept: RADIOLOGY | Facility: MEDICAL CENTER | Age: 33
DRG: 389 | End: 2023-03-07
Attending: STUDENT IN AN ORGANIZED HEALTH CARE EDUCATION/TRAINING PROGRAM
Payer: COMMERCIAL

## 2023-03-07 ENCOUNTER — HOSPITAL ENCOUNTER (INPATIENT)
Facility: MEDICAL CENTER | Age: 33
LOS: 2 days | DRG: 389 | End: 2023-03-09
Attending: STUDENT IN AN ORGANIZED HEALTH CARE EDUCATION/TRAINING PROGRAM | Admitting: STUDENT IN AN ORGANIZED HEALTH CARE EDUCATION/TRAINING PROGRAM
Payer: COMMERCIAL

## 2023-03-07 DIAGNOSIS — N13.30 HYDRONEPHROSIS OF RIGHT KIDNEY: ICD-10-CM

## 2023-03-07 DIAGNOSIS — R82.71 ASYMPTOMATIC BACTERIURIA: ICD-10-CM

## 2023-03-07 DIAGNOSIS — R10.13 EPIGASTRIC PAIN: ICD-10-CM

## 2023-03-07 DIAGNOSIS — K56.609 SMALL BOWEL OBSTRUCTION (HCC): ICD-10-CM

## 2023-03-07 DIAGNOSIS — K56.609 SBO (SMALL BOWEL OBSTRUCTION) (HCC): ICD-10-CM

## 2023-03-07 LAB
ALBUMIN SERPL BCP-MCNC: 4.1 G/DL (ref 3.2–4.9)
ALBUMIN/GLOB SERPL: 1.5 G/DL
ALP SERPL-CCNC: 56 U/L (ref 30–99)
ALT SERPL-CCNC: 11 U/L (ref 2–50)
ANION GAP SERPL CALC-SCNC: 10 MMOL/L (ref 7–16)
APPEARANCE UR: CLEAR
AST SERPL-CCNC: 12 U/L (ref 12–45)
BACTERIA #/AREA URNS HPF: ABNORMAL /HPF
BASOPHILS # BLD AUTO: 0.4 % (ref 0–1.8)
BASOPHILS # BLD: 0.04 K/UL (ref 0–0.12)
BILIRUB SERPL-MCNC: 0.6 MG/DL (ref 0.1–1.5)
BILIRUB UR QL STRIP.AUTO: NEGATIVE
BLOOD CULTURE HOLD CXBCH: NORMAL
BUN SERPL-MCNC: 10 MG/DL (ref 8–22)
CALCIUM ALBUM COR SERPL-MCNC: 8.6 MG/DL (ref 8.5–10.5)
CALCIUM SERPL-MCNC: 8.7 MG/DL (ref 8.5–10.5)
CHLORIDE SERPL-SCNC: 108 MMOL/L (ref 96–112)
CO2 SERPL-SCNC: 22 MMOL/L (ref 20–33)
COLOR UR: YELLOW
CREAT SERPL-MCNC: 0.5 MG/DL (ref 0.5–1.4)
EKG IMPRESSION: NORMAL
EOSINOPHIL # BLD AUTO: 0.22 K/UL (ref 0–0.51)
EOSINOPHIL NFR BLD: 2.4 % (ref 0–6.9)
EPI CELLS #/AREA URNS HPF: ABNORMAL /HPF
ERYTHROCYTE [DISTWIDTH] IN BLOOD BY AUTOMATED COUNT: 41.7 FL (ref 35.9–50)
GFR SERPLBLD CREATININE-BSD FMLA CKD-EPI: 126 ML/MIN/1.73 M 2
GLOBULIN SER CALC-MCNC: 2.7 G/DL (ref 1.9–3.5)
GLUCOSE SERPL-MCNC: 101 MG/DL (ref 65–99)
GLUCOSE UR STRIP.AUTO-MCNC: NEGATIVE MG/DL
HCG SERPL QL: NEGATIVE
HCT VFR BLD AUTO: 39.8 % (ref 37–47)
HGB BLD-MCNC: 13.2 G/DL (ref 12–16)
HYALINE CASTS #/AREA URNS LPF: ABNORMAL /LPF
IMM GRANULOCYTES # BLD AUTO: 0.03 K/UL (ref 0–0.11)
IMM GRANULOCYTES NFR BLD AUTO: 0.3 % (ref 0–0.9)
INR PPP: 1 (ref 0.87–1.13)
KETONES UR STRIP.AUTO-MCNC: NEGATIVE MG/DL
LEUKOCYTE ESTERASE UR QL STRIP.AUTO: ABNORMAL
LIPASE SERPL-CCNC: 39 U/L (ref 11–82)
LYMPHOCYTES # BLD AUTO: 3.3 K/UL (ref 1–4.8)
LYMPHOCYTES NFR BLD: 35.9 % (ref 22–41)
MCH RBC QN AUTO: 29.1 PG (ref 27–33)
MCHC RBC AUTO-ENTMCNC: 33.2 G/DL (ref 33.6–35)
MCV RBC AUTO: 87.7 FL (ref 81.4–97.8)
MICRO URNS: ABNORMAL
MONOCYTES # BLD AUTO: 0.76 K/UL (ref 0–0.85)
MONOCYTES NFR BLD AUTO: 8.3 % (ref 0–13.4)
NEUTROPHILS # BLD AUTO: 4.85 K/UL (ref 2–7.15)
NEUTROPHILS NFR BLD: 52.7 % (ref 44–72)
NITRITE UR QL STRIP.AUTO: NEGATIVE
NRBC # BLD AUTO: 0 K/UL
NRBC BLD-RTO: 0 /100 WBC
PH UR STRIP.AUTO: 6.5 [PH] (ref 5–8)
PLATELET # BLD AUTO: 207 K/UL (ref 164–446)
PMV BLD AUTO: 9.8 FL (ref 9–12.9)
POTASSIUM SERPL-SCNC: 4 MMOL/L (ref 3.6–5.5)
PROT SERPL-MCNC: 6.8 G/DL (ref 6–8.2)
PROT UR QL STRIP: NEGATIVE MG/DL
PROTHROMBIN TIME: 13.1 SEC (ref 12–14.6)
RBC # BLD AUTO: 4.54 M/UL (ref 4.2–5.4)
RBC # URNS HPF: ABNORMAL /HPF
RBC UR QL AUTO: NEGATIVE
SODIUM SERPL-SCNC: 140 MMOL/L (ref 135–145)
SP GR UR STRIP.AUTO: 1.02
UROBILINOGEN UR STRIP.AUTO-MCNC: 0.2 MG/DL
WBC # BLD AUTO: 9.2 K/UL (ref 4.8–10.8)
WBC #/AREA URNS HPF: ABNORMAL /HPF

## 2023-03-07 PROCEDURE — 81001 URINALYSIS AUTO W/SCOPE: CPT

## 2023-03-07 PROCEDURE — 36415 COLL VENOUS BLD VENIPUNCTURE: CPT

## 2023-03-07 PROCEDURE — 93005 ELECTROCARDIOGRAM TRACING: CPT | Performed by: STUDENT IN AN ORGANIZED HEALTH CARE EDUCATION/TRAINING PROGRAM

## 2023-03-07 PROCEDURE — 80053 COMPREHEN METABOLIC PANEL: CPT

## 2023-03-07 PROCEDURE — 87077 CULTURE AEROBIC IDENTIFY: CPT

## 2023-03-07 PROCEDURE — 84703 CHORIONIC GONADOTROPIN ASSAY: CPT

## 2023-03-07 PROCEDURE — 700105 HCHG RX REV CODE 258: Performed by: STUDENT IN AN ORGANIZED HEALTH CARE EDUCATION/TRAINING PROGRAM

## 2023-03-07 PROCEDURE — 87040 BLOOD CULTURE FOR BACTERIA: CPT

## 2023-03-07 PROCEDURE — 700102 HCHG RX REV CODE 250 W/ 637 OVERRIDE(OP): Performed by: STUDENT IN AN ORGANIZED HEALTH CARE EDUCATION/TRAINING PROGRAM

## 2023-03-07 PROCEDURE — 85610 PROTHROMBIN TIME: CPT

## 2023-03-07 PROCEDURE — 83690 ASSAY OF LIPASE: CPT

## 2023-03-07 PROCEDURE — 96375 TX/PRO/DX INJ NEW DRUG ADDON: CPT

## 2023-03-07 PROCEDURE — 700117 HCHG RX CONTRAST REV CODE 255: Performed by: STUDENT IN AN ORGANIZED HEALTH CARE EDUCATION/TRAINING PROGRAM

## 2023-03-07 PROCEDURE — 99285 EMERGENCY DEPT VISIT HI MDM: CPT

## 2023-03-07 PROCEDURE — 99222 1ST HOSP IP/OBS MODERATE 55: CPT | Performed by: SURGERY

## 2023-03-07 PROCEDURE — 96374 THER/PROPH/DIAG INJ IV PUSH: CPT

## 2023-03-07 PROCEDURE — 74177 CT ABD & PELVIS W/CONTRAST: CPT

## 2023-03-07 PROCEDURE — 700111 HCHG RX REV CODE 636 W/ 250 OVERRIDE (IP): Performed by: STUDENT IN AN ORGANIZED HEALTH CARE EDUCATION/TRAINING PROGRAM

## 2023-03-07 PROCEDURE — 87086 URINE CULTURE/COLONY COUNT: CPT

## 2023-03-07 PROCEDURE — A9270 NON-COVERED ITEM OR SERVICE: HCPCS | Performed by: STUDENT IN AN ORGANIZED HEALTH CARE EDUCATION/TRAINING PROGRAM

## 2023-03-07 PROCEDURE — 87186 SC STD MICRODIL/AGAR DIL: CPT

## 2023-03-07 PROCEDURE — 770001 HCHG ROOM/CARE - MED/SURG/GYN PRIV*

## 2023-03-07 PROCEDURE — 85025 COMPLETE CBC W/AUTO DIFF WBC: CPT

## 2023-03-07 PROCEDURE — 99223 1ST HOSP IP/OBS HIGH 75: CPT | Mod: AI,GC | Performed by: STUDENT IN AN ORGANIZED HEALTH CARE EDUCATION/TRAINING PROGRAM

## 2023-03-07 PROCEDURE — 93005 ELECTROCARDIOGRAM TRACING: CPT

## 2023-03-07 RX ORDER — ONDANSETRON 2 MG/ML
4 INJECTION INTRAMUSCULAR; INTRAVENOUS ONCE
Status: COMPLETED | OUTPATIENT
Start: 2023-03-07 | End: 2023-03-07

## 2023-03-07 RX ORDER — KETOROLAC TROMETHAMINE 30 MG/ML
15 INJECTION, SOLUTION INTRAMUSCULAR; INTRAVENOUS EVERY 6 HOURS PRN
Status: DISCONTINUED | OUTPATIENT
Start: 2023-03-07 | End: 2023-03-09

## 2023-03-07 RX ORDER — SODIUM CHLORIDE, SODIUM LACTATE, POTASSIUM CHLORIDE, CALCIUM CHLORIDE 600; 310; 30; 20 MG/100ML; MG/100ML; MG/100ML; MG/100ML
INJECTION, SOLUTION INTRAVENOUS CONTINUOUS
Status: ACTIVE | OUTPATIENT
Start: 2023-03-07 | End: 2023-03-08

## 2023-03-07 RX ORDER — SODIUM CHLORIDE 9 MG/ML
1000 INJECTION, SOLUTION INTRAVENOUS ONCE
Status: COMPLETED | OUTPATIENT
Start: 2023-03-07 | End: 2023-03-07

## 2023-03-07 RX ORDER — HYDROMORPHONE HYDROCHLORIDE 1 MG/ML
1 INJECTION, SOLUTION INTRAMUSCULAR; INTRAVENOUS; SUBCUTANEOUS ONCE
Status: COMPLETED | OUTPATIENT
Start: 2023-03-07 | End: 2023-03-07

## 2023-03-07 RX ORDER — ONDANSETRON 4 MG/1
4 TABLET, ORALLY DISINTEGRATING ORAL EVERY 4 HOURS PRN
Status: DISCONTINUED | OUTPATIENT
Start: 2023-03-07 | End: 2023-03-09

## 2023-03-07 RX ORDER — PROMETHAZINE HYDROCHLORIDE 25 MG/1
12.5-25 SUPPOSITORY RECTAL EVERY 4 HOURS PRN
Status: DISCONTINUED | OUTPATIENT
Start: 2023-03-07 | End: 2023-03-09

## 2023-03-07 RX ORDER — ONDANSETRON 2 MG/ML
4 INJECTION INTRAMUSCULAR; INTRAVENOUS EVERY 4 HOURS PRN
Status: DISCONTINUED | OUTPATIENT
Start: 2023-03-07 | End: 2023-03-09

## 2023-03-07 RX ORDER — PROCHLORPERAZINE EDISYLATE 5 MG/ML
5-10 INJECTION INTRAMUSCULAR; INTRAVENOUS EVERY 4 HOURS PRN
Status: DISCONTINUED | OUTPATIENT
Start: 2023-03-07 | End: 2023-03-09

## 2023-03-07 RX ORDER — ACETAMINOPHEN 325 MG/1
650 TABLET ORAL EVERY 6 HOURS PRN
Status: DISCONTINUED | OUTPATIENT
Start: 2023-03-07 | End: 2023-03-07

## 2023-03-07 RX ORDER — PROMETHAZINE HYDROCHLORIDE 25 MG/1
12.5-25 TABLET ORAL EVERY 4 HOURS PRN
Status: DISCONTINUED | OUTPATIENT
Start: 2023-03-07 | End: 2023-03-09

## 2023-03-07 RX ORDER — ENOXAPARIN SODIUM 100 MG/ML
40 INJECTION SUBCUTANEOUS DAILY
Status: DISCONTINUED | OUTPATIENT
Start: 2023-03-07 | End: 2023-03-09 | Stop reason: HOSPADM

## 2023-03-07 RX ADMIN — HYDROMORPHONE HYDROCHLORIDE 1 MG: 1 INJECTION, SOLUTION INTRAMUSCULAR; INTRAVENOUS; SUBCUTANEOUS at 03:12

## 2023-03-07 RX ADMIN — IOHEXOL 97 ML: 350 INJECTION, SOLUTION INTRAVENOUS at 03:15

## 2023-03-07 RX ADMIN — ONDANSETRON 4 MG: 2 INJECTION INTRAMUSCULAR; INTRAVENOUS at 03:12

## 2023-03-07 RX ADMIN — SODIUM CHLORIDE, POTASSIUM CHLORIDE, SODIUM LACTATE AND CALCIUM CHLORIDE: 600; 310; 30; 20 INJECTION, SOLUTION INTRAVENOUS at 05:56

## 2023-03-07 RX ADMIN — SODIUM CHLORIDE, POTASSIUM CHLORIDE, SODIUM LACTATE AND CALCIUM CHLORIDE: 600; 310; 30; 20 INJECTION, SOLUTION INTRAVENOUS at 13:51

## 2023-03-07 RX ADMIN — SODIUM CHLORIDE 1000 ML: 9 INJECTION, SOLUTION INTRAVENOUS at 02:27

## 2023-03-07 RX ADMIN — ENOXAPARIN SODIUM 40 MG: 40 INJECTION SUBCUTANEOUS at 17:07

## 2023-03-07 RX ADMIN — ACETAMINOPHEN 650 MG: 325 TABLET, FILM COATED ORAL at 08:14

## 2023-03-07 RX ADMIN — CEFTRIAXONE SODIUM 1000 MG: 10 INJECTION, POWDER, FOR SOLUTION INTRAVENOUS at 04:44

## 2023-03-07 ASSESSMENT — ENCOUNTER SYMPTOMS
CONSTIPATION: 0
BLURRED VISION: 0
CHILLS: 0
NAUSEA: 1
FEVER: 0
PALPITATIONS: 0
COUGH: 0
SHORTNESS OF BREATH: 0
VOMITING: 0
BACK PAIN: 0
DIZZINESS: 0
ABDOMINAL PAIN: 1
DOUBLE VISION: 0
HEARTBURN: 0
HEADACHES: 0
MYALGIAS: 0

## 2023-03-07 ASSESSMENT — PAIN DESCRIPTION - PAIN TYPE
TYPE: ACUTE PAIN

## 2023-03-07 ASSESSMENT — FIBROSIS 4 INDEX
FIB4 SCORE: 0.59
FIB4 SCORE: 0.64

## 2023-03-07 ASSESSMENT — PATIENT HEALTH QUESTIONNAIRE - PHQ9
2. FEELING DOWN, DEPRESSED, IRRITABLE, OR HOPELESS: NOT AT ALL
1. LITTLE INTEREST OR PLEASURE IN DOING THINGS: NOT AT ALL
SUM OF ALL RESPONSES TO PHQ9 QUESTIONS 1 AND 2: 0
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
## 2023-03-07 NOTE — ED NOTES
Pt transferred out of ED at this time with Transport team. Pt is A&Ox4, with stable vital signs and no apparent distress upon transfer. Pt transferred on tele monitor and All paperwork and personal belongings sent with pt.
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
## 2023-03-07 NOTE — ED NOTES
Pt back in room from CT. Rounded on pt. Pt resting comfortably in bed at this time. On vitals monitor. Respirations equal and unlabored. Vitals signs stable. No acute distress at this time. Call light within reach.

## 2023-03-07 NOTE — ASSESSMENT & PLAN NOTE
Received ceftriaxone in ED  Denies any symptoms  Will hold off further antibiotics and monitor clinically

## 2023-03-07 NOTE — CARE PLAN
Jena is alert and oriented on room air. Complained of mild headache in morning, relief with PRN tylenol. Up self in room. Remained NPO this shift. Still no BM. Ambulated in keys multiple times this shift. Vitals stable, hourly rounding performed.    Problem: Knowledge Deficit - Standard  Goal: Patient and family/care givers will demonstrate understanding of plan of care, disease process/condition, diagnostic tests and medications  Outcome: Progressing     Problem: Pain - Standard  Goal: Alleviation of pain or a reduction in pain to the patient’s comfort goal  Outcome: Progressing  Note: Pt reported pain relief with PRN tylenol   The patient is Stable - Low risk of patient condition declining or worsening
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
## 2023-03-07 NOTE — ASSESSMENT & PLAN NOTE
Risk factor is previous abdominal surgery  EDP discussed with Dr. Hearn with surgery, supportive care for now  -03/08 Update  -Patient had a small bowel movement this morning.  Notes improvement in abdominal pain.  Denies nausea or vomiting.    -Passed flatulence yesterday  -General surgery on board.  No invasive interventions required.  Continue with conservative therapy and can consider SBFT. Per radiology SBFT not required right now.  -Patient now on clear liquid diet.  We will see how patient tolerates it.    -On mIVF

## 2023-03-07 NOTE — ED TRIAGE NOTES
"Chief Complaint   Patient presents with    Epigastric Pain     X1 hour, reports that it awoke her from sleep. Pt states that she occ has \"contraction\" like pain and develops extreme SOB with it. Denies any allergies, asthma or other pulm dx. Pt has complex abd surg hx, last one in 2015 apx. Denies vomiting, +nausea     Pt ambulatory to triage for above complaint. Pt reports recent travel to [facility] in January.     Pt A&Ox4, GCS15, NAD currently. Abd pain protocol ordered in triage. Pt placed back in ER lobby and encouraged to alert staff of any changes    /80   Pulse (!) 106   Temp 36.8 °C (98.2 °F) (Temporal)   Resp 20   Ht 1.575 m (5' 2\")   Wt 68 kg (150 lb)   LMP 02/05/2023 (Approximate)   SpO2 100%   BMI 27.44 kg/m²     "

## 2023-03-07 NOTE — ED NOTES
Pt roomed in [facility]. Changed into hospital gown and placed on monitor. Chart up for ERP. Call light within reach.

## 2023-03-07 NOTE — ED NOTES
Rounded on pt. Pt resting comfortably in bed at this time. On vitals monitor. Respirations equal and unlabored. Vitals signs stable. No acute distress at this time. Call light within reach.

## 2023-03-07 NOTE — CARE PLAN
4 Eyes Skin Assessment Completed by Joan armenta RN and mcmanus RN.    Head wdl  Ears wdl  Nose wdl  Mouth wdl  Neck wdl  Breast/Chest wdl  Shoulder Blades wdl  Spine wdl  (R) Arm/Elbow/Hand wdl  (L) Arm/Elbow/Hand wdl  Abdomen wdl  Groin wdl  Scrotum/Coccyx/Buttocks wdl  (R) Leg wdl  (L) Leg wdl  (R) Heel/Foot/Toe wdl  (L) Heel/Foot/Toe wdl            Problem: Knowledge Deficit - Standard  Goal: Patient and family/care givers will demonstrate understanding of plan of care, disease process/condition, diagnostic tests and medications  Outcome: Progressing   The patient is Stable - Low risk of patient condition declining or worsening         Progress made toward(s) clinical / shift goals:  y    Patient is not progressing towards the following goals:

## 2023-03-08 ENCOUNTER — APPOINTMENT (OUTPATIENT)
Dept: RADIOLOGY | Facility: MEDICAL CENTER | Age: 33
DRG: 389 | End: 2023-03-08
Payer: COMMERCIAL

## 2023-03-08 LAB
ANION GAP SERPL CALC-SCNC: 10 MMOL/L (ref 7–16)
BUN SERPL-MCNC: 6 MG/DL (ref 8–22)
CALCIUM SERPL-MCNC: 8.2 MG/DL (ref 8.5–10.5)
CHLORIDE SERPL-SCNC: 109 MMOL/L (ref 96–112)
CO2 SERPL-SCNC: 21 MMOL/L (ref 20–33)
CREAT SERPL-MCNC: 0.48 MG/DL (ref 0.5–1.4)
ERYTHROCYTE [DISTWIDTH] IN BLOOD BY AUTOMATED COUNT: 41.3 FL (ref 35.9–50)
GFR SERPLBLD CREATININE-BSD FMLA CKD-EPI: 127 ML/MIN/1.73 M 2
GLUCOSE SERPL-MCNC: 78 MG/DL (ref 65–99)
HCT VFR BLD AUTO: 36.1 % (ref 37–47)
HGB BLD-MCNC: 11.7 G/DL (ref 12–16)
MCH RBC QN AUTO: 28.9 PG (ref 27–33)
MCHC RBC AUTO-ENTMCNC: 32.4 G/DL (ref 33.6–35)
MCV RBC AUTO: 89.1 FL (ref 81.4–97.8)
PLATELET # BLD AUTO: 176 K/UL (ref 164–446)
PMV BLD AUTO: 9.8 FL (ref 9–12.9)
POTASSIUM SERPL-SCNC: 3.7 MMOL/L (ref 3.6–5.5)
RBC # BLD AUTO: 4.05 M/UL (ref 4.2–5.4)
SODIUM SERPL-SCNC: 140 MMOL/L (ref 135–145)
WBC # BLD AUTO: 6.2 K/UL (ref 4.8–10.8)

## 2023-03-08 PROCEDURE — 99231 SBSQ HOSP IP/OBS SF/LOW 25: CPT | Performed by: SURGERY

## 2023-03-08 PROCEDURE — 80048 BASIC METABOLIC PNL TOTAL CA: CPT

## 2023-03-08 PROCEDURE — 85027 COMPLETE CBC AUTOMATED: CPT

## 2023-03-08 PROCEDURE — 770006 HCHG ROOM/CARE - MED/SURG/GYN SEMI*

## 2023-03-08 PROCEDURE — 700111 HCHG RX REV CODE 636 W/ 250 OVERRIDE (IP): Performed by: STUDENT IN AN ORGANIZED HEALTH CARE EDUCATION/TRAINING PROGRAM

## 2023-03-08 PROCEDURE — 99233 SBSQ HOSP IP/OBS HIGH 50: CPT | Mod: GC | Performed by: HOSPITALIST

## 2023-03-08 PROCEDURE — 700105 HCHG RX REV CODE 258: Performed by: STUDENT IN AN ORGANIZED HEALTH CARE EDUCATION/TRAINING PROGRAM

## 2023-03-08 PROCEDURE — 36415 COLL VENOUS BLD VENIPUNCTURE: CPT

## 2023-03-08 RX ORDER — SODIUM CHLORIDE, SODIUM LACTATE, POTASSIUM CHLORIDE, CALCIUM CHLORIDE 600; 310; 30; 20 MG/100ML; MG/100ML; MG/100ML; MG/100ML
INJECTION, SOLUTION INTRAVENOUS CONTINUOUS
Status: DISCONTINUED | OUTPATIENT
Start: 2023-03-08 | End: 2023-03-09

## 2023-03-08 RX ADMIN — SODIUM CHLORIDE, POTASSIUM CHLORIDE, SODIUM LACTATE AND CALCIUM CHLORIDE: 600; 310; 30; 20 INJECTION, SOLUTION INTRAVENOUS at 12:38

## 2023-03-08 RX ADMIN — ENOXAPARIN SODIUM 40 MG: 40 INJECTION SUBCUTANEOUS at 17:49

## 2023-03-08 RX ADMIN — SODIUM CHLORIDE, POTASSIUM CHLORIDE, SODIUM LACTATE AND CALCIUM CHLORIDE: 600; 310; 30; 20 INJECTION, SOLUTION INTRAVENOUS at 23:14

## 2023-03-08 ASSESSMENT — COGNITIVE AND FUNCTIONAL STATUS - GENERAL
MOBILITY SCORE: 24
SUGGESTED CMS G CODE MODIFIER MOBILITY: CH
DAILY ACTIVITIY SCORE: 24
SUGGESTED CMS G CODE MODIFIER DAILY ACTIVITY: CH

## 2023-03-08 ASSESSMENT — LIFESTYLE VARIABLES
ON A TYPICAL DAY WHEN YOU DRINK ALCOHOL HOW MANY DRINKS DO YOU HAVE: 0
TOTAL SCORE: 0
HOW MANY TIMES IN THE PAST YEAR HAVE YOU HAD 5 OR MORE DRINKS IN A DAY: 0
AVERAGE NUMBER OF DAYS PER WEEK YOU HAVE A DRINK CONTAINING ALCOHOL: 0
EVER FELT BAD OR GUILTY ABOUT YOUR DRINKING: NO
TOTAL SCORE: 0
CONSUMPTION TOTAL: NEGATIVE
TOTAL SCORE: 0
HAVE YOU EVER FELT YOU SHOULD CUT DOWN ON YOUR DRINKING: NO
HAVE PEOPLE ANNOYED YOU BY CRITICIZING YOUR DRINKING: NO
ALCOHOL_USE: NO
EVER HAD A DRINK FIRST THING IN THE MORNING TO STEADY YOUR NERVES TO GET RID OF A HANGOVER: NO

## 2023-03-08 ASSESSMENT — PAIN DESCRIPTION - PAIN TYPE
TYPE: ACUTE PAIN;CHRONIC PAIN
TYPE: ACUTE PAIN

## 2023-03-08 NOTE — PROGRESS NOTES
"  DATE: 3/8/2023    Hospital Day 2  SBO .    INTERVAL EVENTS:  Had a bowel movement this morning and is passing flatus.  I would favor continuing with the SBFT for therapeutic reasons. Ok for clear liquids.     REVIEW OF SYSTEMS:  Comprehensive review of systems is negative with the exception of the aforementioned HPI, PMH, and PSH bullets in accordance with CMS guidelines.    PHYSICAL EXAMINATION:    Vital Signs: /79   Pulse 99   Temp 36.8 °C (98.3 °F) (Temporal)   Resp 17   Ht 1.575 m (5' 2\")   Wt 70.5 kg (155 lb 6.8 oz)   SpO2 100%   Physical Exam  Vitals and nursing note reviewed. Exam conducted with a chaperone present.   Constitutional:       Appearance: Normal appearance.   HENT:      Head: Normocephalic and atraumatic.      Right Ear: External ear normal.      Left Ear: External ear normal.      Nose: Nose normal.      Mouth/Throat:      Mouth: Mucous membranes are moist.   Eyes:      Pupils: Pupils are equal, round, and reactive to light.   Cardiovascular:      Rate and Rhythm: Normal rate and regular rhythm.      Pulses: Normal pulses.   Pulmonary:      Effort: Pulmonary effort is normal.   Abdominal:      General: Abdomen is flat. There is no distension.      Palpations: Abdomen is soft.      Tenderness: There is no abdominal tenderness.   Musculoskeletal:         General: No swelling or tenderness. Normal range of motion.      Cervical back: Normal range of motion. No tenderness.   Skin:     General: Skin is warm.      Capillary Refill: Capillary refill takes less than 2 seconds.   Neurological:      Mental Status: She is alert and oriented to person, place, and time.   Psychiatric:         Mood and Affect: Mood normal.         Behavior: Behavior normal.       LABORATORY VALUES:   Recent Labs     03/07/23  0142 03/08/23  0531   WBC 9.2 6.2   RBC 4.54 4.05*   HEMOGLOBIN 13.2 11.7*   HEMATOCRIT 39.8 36.1*   MCV 87.7 89.1   MCH 29.1 28.9   MCHC 33.2* 32.4*   RDW 41.7 41.3   PLATELETCT 207 176 "   MPV 9.8 9.8     Recent Labs     03/07/23 0142 03/08/23  0531   SODIUM 140 140   POTASSIUM 4.0 3.7   CHLORIDE 108 109   CO2 22 21   GLUCOSE 101* 78   BUN 10 6*   CREATININE 0.50 0.48*   CALCIUM 8.7 8.2*     Recent Labs     03/07/23  0142   ASTSGOT 12   ALTSGPT 11   TBILIRUBIN 0.6   ALKPHOSPHAT 56   GLOBULIN 2.7   INR 1.00     Recent Labs     03/07/23  0142   INR 1.00        IMAGING:   CT-ABDOMEN-PELVIS WITH   Final Result         1.  Distention of distal small bowel loops with fecalization of small bowel contents at the distal small bowel anastomosis suggesting component of obstructive changes.   2.  Mild right hydronephrosis without visualized obstructing stone   3.  Cholelithiasis          ASSESSMENT AND PLAN:     * SBO (small bowel obstruction) (HCC)- (present on admission)  Assessment & Plan  Conservative therapy.   Cont with SBFT for therapeutic effect.   Clear liquids ok.              ____________________________________     Kesha Astorga M.D.    DD: 3/8/2023  9:25 AM

## 2023-03-08 NOTE — CARE PLAN
The patient is Stable - Low risk of patient condition declining or worsening         Progress made toward(s) clinical / shift goals:  y  Problem: Knowledge Deficit - Standard  Goal: Patient and family/care givers will demonstrate understanding of plan of care, disease process/condition, diagnostic tests and medications  3/7/2023 1949 by Joan Goddard R.N.  Outcome: Progressing  3/7/2023 0607 by Joan Goddard R.N.  Outcome: Progressing     Problem: Pain - Standard  Goal: Alleviation of pain or a reduction in pain to the patient’s comfort goal  Outcome: Progressing       Patient is not progressing towards the following goals:
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
## 2023-03-08 NOTE — ASSESSMENT & PLAN NOTE
Conservative therapy.   Radiology refused SBFT.   Ok for full liquids.  Recommend full liquids and soft food only for the next week.
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
## 2023-03-08 NOTE — CARE PLAN
The patient is Stable - Low risk of patient condition declining or worsening    Shift Goals  Clinical Goals: monitor bowel routine  Patient Goals: rest    Progress made toward(s) clinical / shift goals:      Patient A&O x 4.  Patient able to make needs known to staff.  Patient ambulated in hallway several times today.  Patient denies pain and has not requested pain medications during day shift.  Patient had 2 small bowel movements during the shift.  Patient has tolerated clear liquid diet today.  Medications administered per orders.  Patient provided opportunity to ask questions.  All questions answered during the shift.      Problem: Knowledge Deficit - Standard  Goal: Patient and family/care givers will demonstrate understanding of plan of care, disease process/condition, diagnostic tests and medications  Outcome: Progressing     Problem: Pain - Standard  Goal: Alleviation of pain or a reduction in pain to the patient’s comfort goal  Outcome: Progressing

## 2023-03-09 VITALS
HEART RATE: 93 BPM | DIASTOLIC BLOOD PRESSURE: 63 MMHG | BODY MASS INDEX: 28.6 KG/M2 | SYSTOLIC BLOOD PRESSURE: 99 MMHG | TEMPERATURE: 97.6 F | HEIGHT: 62 IN | RESPIRATION RATE: 17 BRPM | OXYGEN SATURATION: 97 % | WEIGHT: 155.42 LBS

## 2023-03-09 LAB
APOB+LDLR+PCSK9 GENE MUT ANL BLD/T: NOT DETECTED
BACTERIA UR CULT: ABNORMAL
BACTERIA UR CULT: ABNORMAL
BRCA1+BRCA2 DEL+DUP + FULL MUT ANL BLD/T: NOT DETECTED
MLH1+MSH2+MSH6+PMS2 GN DEL+DUP+FUL M: NOT DETECTED
SIGNIFICANT IND 70042: ABNORMAL
SITE SITE: ABNORMAL
SOURCE SOURCE: ABNORMAL

## 2023-03-09 PROCEDURE — 99231 SBSQ HOSP IP/OBS SF/LOW 25: CPT | Performed by: SURGERY

## 2023-03-09 PROCEDURE — 99239 HOSP IP/OBS DSCHRG MGMT >30: CPT | Mod: GC | Performed by: HOSPITALIST

## 2023-03-09 PROCEDURE — 700105 HCHG RX REV CODE 258: Performed by: STUDENT IN AN ORGANIZED HEALTH CARE EDUCATION/TRAINING PROGRAM

## 2023-03-09 RX ADMIN — SODIUM CHLORIDE, POTASSIUM CHLORIDE, SODIUM LACTATE AND CALCIUM CHLORIDE: 600; 310; 30; 20 INJECTION, SOLUTION INTRAVENOUS at 12:29

## 2023-03-09 ASSESSMENT — PAIN DESCRIPTION - PAIN TYPE: TYPE: ACUTE PAIN;CHRONIC PAIN

## 2023-03-09 NOTE — PROGRESS NOTES
"  DATE: 3/9/2023    Hospital Day 3  SBO .    INTERVAL EVENTS:  Had a second bowel movement. Tolerating clear liquids.  Ok for full liquids.     REVIEW OF SYSTEMS:  Comprehensive review of systems is negative with the exception of the aforementioned HPI, PMH, and PSH bullets in accordance with CMS guidelines.    PHYSICAL EXAMINATION:    Vital Signs: BP 96/56   Pulse 86   Temp 36.4 °C (97.6 °F) (Temporal)   Resp 17   Ht 1.575 m (5' 2\")   Wt 70.5 kg (155 lb 6.8 oz)   SpO2 96%   Physical Exam  Vitals and nursing note reviewed. Exam conducted with a chaperone present.   Constitutional:       Appearance: Normal appearance.   HENT:      Head: Normocephalic and atraumatic.      Right Ear: External ear normal.      Left Ear: External ear normal.      Nose: Nose normal.      Mouth/Throat:      Mouth: Mucous membranes are moist.   Eyes:      Pupils: Pupils are equal, round, and reactive to light.   Cardiovascular:      Rate and Rhythm: Normal rate and regular rhythm.      Pulses: Normal pulses.   Pulmonary:      Effort: Pulmonary effort is normal.   Abdominal:      General: Abdomen is flat. There is no distension.      Palpations: Abdomen is soft.      Tenderness: There is no abdominal tenderness.   Musculoskeletal:         General: No swelling or tenderness. Normal range of motion.      Cervical back: Normal range of motion. No tenderness.   Skin:     General: Skin is warm.      Capillary Refill: Capillary refill takes less than 2 seconds.   Neurological:      Mental Status: She is alert and oriented to person, place, and time.   Psychiatric:         Mood and Affect: Mood normal.         Behavior: Behavior normal.       LABORATORY VALUES:   Recent Labs     03/07/23 0142 03/08/23 0531   WBC 9.2 6.2   RBC 4.54 4.05*   HEMOGLOBIN 13.2 11.7*   HEMATOCRIT 39.8 36.1*   MCV 87.7 89.1   MCH 29.1 28.9   MCHC 33.2* 32.4*   RDW 41.7 41.3   PLATELETCT 207 176   MPV 9.8 9.8     Recent Labs     03/07/23 0142 03/08/23 0531 "   SODIUM 140 140   POTASSIUM 4.0 3.7   CHLORIDE 108 109   CO2 22 21   GLUCOSE 101* 78   BUN 10 6*   CREATININE 0.50 0.48*   CALCIUM 8.7 8.2*     Recent Labs     03/07/23  0142   ASTSGOT 12   ALTSGPT 11   TBILIRUBIN 0.6   ALKPHOSPHAT 56   GLOBULIN 2.7   INR 1.00     Recent Labs     03/07/23  0142   INR 1.00        IMAGING:   CT-ABDOMEN-PELVIS WITH   Final Result         1.  Distention of distal small bowel loops with fecalization of small bowel contents at the distal small bowel anastomosis suggesting component of obstructive changes.   2.  Mild right hydronephrosis without visualized obstructing stone   3.  Cholelithiasis          ASSESSMENT AND PLAN:     * SBO (small bowel obstruction) ([facility])- (present on admission)  Assessment & Plan  Conservative therapy.   Radiology refused SBFT.   Ok for full liquids.  Recommend full liquids and soft food only for the next week.              ____________________________________     Kesha Astorga M.D.    DD: 3/8/2023  9:25 AM
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
## 2023-03-09 NOTE — PROGRESS NOTES
Pt is A&O x4, RA, ambulatory w/ no assist. All medications taken and tolerated, no c/o pain at this time. IV fluids infusing. Pt resting in bed w/ call light and belongings in reach, treaded socks on, bed in lowest position. No additional needs at this time.

## 2023-03-09 NOTE — CARE PLAN
The patient is Stable - Low risk of patient condition declining or worsening    Shift Goals  Clinical Goals: Monitor bowel routine  Patient Goals: rest    Progress made toward(s) clinical / shift goals:      Patient A&O x 4.  Patient able to make needs known to staff.  Patient tolerated full liquid diet without difficulty.  Patient will be discharged this evening after dinner.  IVF infusing without difficulty. Patient had several bowel movements during day shift.  Abdomen is soft and non-tender at this time.  Medications administered per orders.  Review of discharge instructions with patient and family.  IV removed with pressure dressing in place.  Catheter tip intact.  Patient and family provided opportunity to ask questions.  All questions answered during shift.    Problem: Knowledge Deficit - Standard  Goal: Patient and family/care givers will demonstrate understanding of plan of care, disease process/condition, diagnostic tests and medications  Outcome: Progressing     Problem: Pain - Standard  Goal: Alleviation of pain or a reduction in pain to the patient’s comfort goal  Outcome: Progressing

## 2023-03-09 NOTE — CARE PLAN
The patient is Stable - Low risk of patient condition declining or worsening    Shift Goals  Clinical Goals: Monitor bowel routine, rest  Patient Goals: Rest    Progress made toward(s) clinical / shift goals: Pt reporting bowel movements and passing gas throughout day.  No c/o pain.     Problem: Knowledge Deficit - Standard  Goal: Patient and family/care givers will demonstrate understanding of plan of care, disease process/condition, diagnostic tests and medications  Outcome: Progressing     Problem: Pain - Standard  Goal: Alleviation of pain or a reduction in pain to the patient’s comfort goal  Outcome: Progressing
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
## 2023-03-12 LAB
BACTERIA BLD CULT: NORMAL
SIGNIFICANT IND 70042: NORMAL
SITE SITE: NORMAL
SOURCE SOURCE: NORMAL

## 2023-03-15 ENCOUNTER — OFFICE VISIT (OUTPATIENT)
Dept: MEDICAL GROUP | Facility: PHYSICIAN GROUP | Age: 33
End: 2023-03-15
Payer: COMMERCIAL

## 2023-03-15 VITALS
WEIGHT: 149 LBS | DIASTOLIC BLOOD PRESSURE: 78 MMHG | HEART RATE: 88 BPM | HEIGHT: 62 IN | BODY MASS INDEX: 27.42 KG/M2 | SYSTOLIC BLOOD PRESSURE: 110 MMHG | TEMPERATURE: 98 F | OXYGEN SATURATION: 97 %

## 2023-03-15 DIAGNOSIS — R87.619 ABNORMAL CERVICAL PAPANICOLAOU SMEAR, UNSPECIFIED ABNORMAL PAP FINDING: ICD-10-CM

## 2023-03-15 DIAGNOSIS — K56.609 SBO (SMALL BOWEL OBSTRUCTION) (HCC): ICD-10-CM

## 2023-03-15 PROCEDURE — 99214 OFFICE O/P EST MOD 30 MIN: CPT

## 2023-03-15 ASSESSMENT — ENCOUNTER SYMPTOMS
NAUSEA: 0
BLOOD IN STOOL: 0
ABDOMINAL PAIN: 0
BACK PAIN: 1
CONSTIPATION: 1
DIARRHEA: 1
VOMITING: 0
FEVER: 0

## 2023-03-15 ASSESSMENT — FIBROSIS 4 INDEX: FIB4 SCORE: 0.7
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
## 2023-03-15 NOTE — ASSESSMENT & PLAN NOTE
Acute, symptoms improving. Conservative treatment with liquids and soft diet, pain is resolved, passing flatus, and stool, however stool in inconsistent, sometimes hard/constipated, sometimes runny.    Reports she was told it will likely come back and will eventually need surgery again for this.   Provided referral for general surgery.     CT: IMPRESSION:  1.  Distention of distal small bowel loops with fecalization of small bowel contents at the distal small bowel anastomosis suggesting component of obstructive changes.  2.  Mild right hydronephrosis without visualized obstructing stone  3.  Cholelithiasis

## 2023-03-15 NOTE — PATIENT INSTRUCTIONS
"What is a small bowel obstruction?  A small bowel obstruction (SBO) is a condition in which the small intestine gets blocked. \"Small bowel\" is another term for the small intestine (figure 1). In an SBO, air, fluid, and food get stuck in the intestine. They can't move through the small intestine the way they normally would.  The intestine can be partly or completely blocked in an SBO. A complete block can lead to serious problems. That's because:  ?The intestine can get swollen from the trapped air, fluid, and food. This swelling can make the intestine less able to absorb fluid, which can lead to dehydration and kidney failure.  ?If the intestine wall tears, the fluid in the intestine can leak out. This can cause a belly infection.  ?When the intestine is blocked, the blood vessels that bring oxygen to the intestine can get blocked, too. Without blood, parts of the intestine can die.  In some cases, the small intestine looks blocked on tests even when it isn't. This could be from either intestinal \"ileus\" or \"pseudo-obstruction.\" This article is only about real obstruction.  What causes an SBO?  The most common causes of an SBO are:  ?Past surgery in the belly - After surgery, scar tissue can form in the belly and press on the intestine.  ?Hernias - A hernia is an opening in the muscle or tissue that covers the muscle. Part of the intestine can slide through that opening and get trapped in a hernia. A hernia inside the belly can also cause SBO.  ?Twisting of the intestines  ?Tumor - Tumors (cancerous and noncancerous) can grow inside or outside the intestine and block it.  What are the symptoms of an SBO?  Symptoms depend on where your intestine is blocked and how blocked it is. The most common symptoms are:  ?Nausea and vomiting  ?Belly pain  ?Belly swelling and bloating  ?Not being able to have a bowel movement or pass gas  Will I need tests?  Yes. Your doctor will ask about your symptoms and do an exam. If your " "doctor thinks that you have an SBO, they will do imaging tests of your belly and blood tests.  The imaging tests can include an X-ray, CT scan, or a series of X-rays called a \"GI series.\" For the CT scan and GI series, you might drink a liquid called \"contrast\" beforehand. The contrast will show up on the CT scan or X-rays. It can help the doctor see what's causing the blockage.  How is an SBO treated?  Treatment depends on the blockage and your symptoms.  If you have an SBO, you will be treated in the hospital. Your doctor will give you fluids and medicines. You will not be allowed to eat or drink. You will also get something called a \"nasogastric tube.\" This is a thin tube that goes in your nose, down your esophagus, and into your stomach. The tube can suck up the fluid and air in your stomach. This will make your stomach feel better and help keep you from vomiting.  Most people will not need any other treatment. That's because, many times, an SBO can get better on its own. This is often the case for a partial SBO, or an SBO that has happened after earlier belly surgery.  Some people will need surgery. You are likely to need surgery if you have a belly infection, you have a complete blockage, or your SBO does not get better with fluids and the nasogastric tube in a few days. You also need surgery if the SBO is caused by a hernia, a tumor, or a twist in the intestine.  During surgery, your doctor will remove what is causing the blockage and, if needed, any unhealthy parts of your small intestine. If your doctor removes the unhealthy intestine, they will usually reconnect your intestines so you will not need an ostomy bag. (An ostomy bag is a bag that attaches to the skin and collects bowel movements. It is used if the intestines cannot be reconnected after a surgery.)  "

## 2023-04-03 ENCOUNTER — PHYSICAL THERAPY (OUTPATIENT)
Dept: PHYSICAL THERAPY | Facility: REHABILITATION | Age: 33
End: 2023-04-03
Payer: COMMERCIAL

## 2023-04-03 DIAGNOSIS — M54.6 CHRONIC RIGHT-SIDED THORACIC BACK PAIN: ICD-10-CM

## 2023-04-03 DIAGNOSIS — G89.29 CHRONIC RIGHT-SIDED THORACIC BACK PAIN: ICD-10-CM

## 2023-04-03 PROCEDURE — 97110 THERAPEUTIC EXERCISES: CPT

## 2023-04-03 PROCEDURE — 97162 PT EVAL MOD COMPLEX 30 MIN: CPT

## 2023-04-03 SDOH — ECONOMIC STABILITY: GENERAL: QUALITY OF LIFE: GOOD

## 2023-04-03 ASSESSMENT — ENCOUNTER SYMPTOMS
QUALITY: DISCOMFORT
ALLEVIATING FACTORS: STRETCHING
EXACERBATED BY: PROLONGED SITTING
EXACERBATED BY: LIFTING
PAIN SCALE AT LOWEST: 2
ALLEVIATING FACTORS: CHANGE IN POSITION
PAIN TIMING: EVERY EVENING
EXACERBATED BY: BENDING
PAIN SCALE: 3
PAIN TIMING: IN THE MORNING
ALLEVIATING FACTORS: ACTIVITY MODIFICATION
PAIN SCALE AT HIGHEST: 5
EXACERBATED BY: PROLONGED STANDING
QUALITY: TIGHTNESS
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
## 2023-04-10 ENCOUNTER — PHYSICAL THERAPY (OUTPATIENT)
Dept: PHYSICAL THERAPY | Facility: REHABILITATION | Age: 33
End: 2023-04-10
Payer: COMMERCIAL

## 2023-04-10 DIAGNOSIS — G89.29 CHRONIC RIGHT-SIDED THORACIC BACK PAIN: ICD-10-CM

## 2023-04-10 DIAGNOSIS — M54.6 CHRONIC RIGHT-SIDED THORACIC BACK PAIN: ICD-10-CM

## 2023-04-10 PROCEDURE — 97140 MANUAL THERAPY 1/> REGIONS: CPT

## 2023-04-10 PROCEDURE — 97110 THERAPEUTIC EXERCISES: CPT

## 2023-04-10 PROCEDURE — 97112 NEUROMUSCULAR REEDUCATION: CPT
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
## 2023-04-17 ENCOUNTER — PHYSICAL THERAPY (OUTPATIENT)
Dept: PHYSICAL THERAPY | Facility: REHABILITATION | Age: 33
End: 2023-04-17
Payer: COMMERCIAL

## 2023-04-17 DIAGNOSIS — M54.6 CHRONIC RIGHT-SIDED THORACIC BACK PAIN: ICD-10-CM

## 2023-04-17 DIAGNOSIS — G89.29 CHRONIC RIGHT-SIDED THORACIC BACK PAIN: ICD-10-CM

## 2023-04-17 PROCEDURE — 97112 NEUROMUSCULAR REEDUCATION: CPT

## 2023-04-17 PROCEDURE — 97140 MANUAL THERAPY 1/> REGIONS: CPT

## 2023-04-17 PROCEDURE — 97110 THERAPEUTIC EXERCISES: CPT

## 2023-04-17 NOTE — OP THERAPY DAILY TREATMENT
Outpatient Physical Therapy  DAILY TREATMENT     [facility] Outpatient Physical Therapy [place] [address] [address] [facility], [address]  [place] [address]  Phone:  [phone]    Date: 04/17/2023    Patient: Jena Yepez  YOB: 1990  MRN: [id number]     Time Calculation    Start time: 0345  Stop time: 0430 Time Calculation (min): 45 minutes         Chief Complaint: Back Problem    Visit #: 3    SUBJECTIVE:  The patient reports less stiffness to the (R) lower back and the middle back area.    OBJECTIVE:  Current objective measures:       Decrease mid thoracic -lumbar tightness in rotation        Therapeutic Exercises (CPT 13404):     1. knees to chest, 2 x20 sec    2. posterior pelvic tilts, 1 x10 reps    3. bridges, 1 x10 reps    4. piriformis stretch, 2 x30 sec    5. abdominal crunches, 1 x10 reps    6. oblique crunches    7. prone hip extension, 1 x 5 reps    8. quadriped UE/LE lifts    9. theraball roll-outs    Therapeutic Treatments and Modalities:     1. Manual Therapy (CPT 33976), Lumbar, CPA's grade 2-3 at L4-5; L3-4; L2-3  Time-based treatments/modalities:    Physical Therapy Timed Treatment Charges  Manual therapy minutes (CPT 99036): 15 minutes  Neuromusc re-ed, balance, coor, post minutes (CPT 12762): 15 minutes  Therapeutic exercise minutes (CPT 56653): 15 minutes    ASSESSMENT:   Response to treatment:   CPA's grade 2-3 at L3-L4-L5; tenderness to L3 to the (R) side with sharpness upon pressure to facet. Self care instructions for TrA activation during cat cow stretch. Patient has difficulty with performing prone hip extensions in all phases of position; weakness in glute hip extensors; Next visit: bridging      PLAN/RECOMMENDATIONS:   Plan for treatment: therapy treatment to continue next visit.  Planned interventions for next visit: continue with current treatment.

## 2023-04-24 ENCOUNTER — PHYSICAL THERAPY (OUTPATIENT)
Dept: PHYSICAL THERAPY | Facility: REHABILITATION | Age: 33
End: 2023-04-24
Payer: COMMERCIAL

## 2023-04-24 DIAGNOSIS — G89.29 CHRONIC RIGHT-SIDED THORACIC BACK PAIN: ICD-10-CM

## 2023-04-24 DIAGNOSIS — M54.6 CHRONIC RIGHT-SIDED THORACIC BACK PAIN: ICD-10-CM

## 2023-04-24 PROCEDURE — 97112 NEUROMUSCULAR REEDUCATION: CPT

## 2023-04-24 PROCEDURE — 97110 THERAPEUTIC EXERCISES: CPT

## 2023-04-24 PROCEDURE — 97140 MANUAL THERAPY 1/> REGIONS: CPT

## 2023-04-24 NOTE — OP THERAPY DAILY TREATMENT
Outpatient Physical Therapy  DAILY TREATMENT     [facility] Outpatient Physical Therapy [place] [address] [address] [facility], [address]  [place] [address]  Phone:  [phone]    Date: 04/24/2023    Patient: Jena Yepez  YOB: 1990  MRN: [id number]     Time Calculation    Start time: 0345  Stop time: 0430 Time Calculation (min): 45 minutes         Chief Complaint: Back Problem    Visit #: 4    SUBJECTIVE:  The patient reports still having some discomfort to the mid to low back area. She had some symptoms following after last session but not painful.    OBJECTIVE:  Current objective measures:       Increase mobility at T10-T12 by 1/2 to 1 grade; improve (L) trunk rotation       Therapeutic Exercises (CPT 71403):     1. knees to chest, 2 x20 sec    2. posterior pelvic tilts, 1 x10 reps    3. bridges, 1 x10 reps    4. piriformis stretch, 2 x30 sec    5. abdominal crunches, 1 x10 reps    6. oblique crunches    7. prone hip extension, 1 x 5 reps, difficulty with lifting hip in extensin from prone    8. quadriped UE/LE lifts    9. theraball roll-outs    10. prayer stretch/ deviated prayer stretch, 2 x20 sec    11. step ups    12. ball bridges    13. quad kick backs    Therapeutic Treatments and Modalities:     1. Manual Therapy (CPT 76612), Lumbar, CPA's grade 2-3 at L4-5; L3-4; L2-3  Time-based treatments/modalities:    Physical Therapy Timed Treatment Charges  Manual therapy minutes (CPT 92699): 15 minutes  Neuromusc re-ed, balance, coor, post minutes (CPT 01540): 15 minutes  Therapeutic exercise minutes (CPT 04990): 15 minutes    ASSESSMENT:   Response to treatment:   CPA's grade at L2-L4 with tenderness to the (R) side. AROM in trunk extension less tolerable but forward flexion improved with less tightness to the lumbar. Patient has difficulty with bilateral hip extension from prone; side-lying or standing positions; appears like a fear avoidance for some reason. Next visit: glute hip extension ;  quadriped kickbacks; step ups        PLAN/RECOMMENDATIONS:   Plan for treatment: therapy treatment to continue next visit.  Planned interventions for next visit: continue with current treatment.

## 2023-05-01 ENCOUNTER — PHYSICAL THERAPY (OUTPATIENT)
Dept: PHYSICAL THERAPY | Facility: REHABILITATION | Age: 33
End: 2023-05-01
Payer: COMMERCIAL

## 2023-05-01 DIAGNOSIS — G89.29 CHRONIC RIGHT-SIDED THORACIC BACK PAIN: ICD-10-CM

## 2023-05-01 DIAGNOSIS — M54.6 CHRONIC RIGHT-SIDED THORACIC BACK PAIN: ICD-10-CM

## 2023-05-01 PROCEDURE — 97112 NEUROMUSCULAR REEDUCATION: CPT

## 2023-05-01 PROCEDURE — 97140 MANUAL THERAPY 1/> REGIONS: CPT

## 2023-05-01 PROCEDURE — 97110 THERAPEUTIC EXERCISES: CPT

## 2023-05-01 NOTE — OP THERAPY DAILY TREATMENT
Outpatient Physical Therapy  DAILY TREATMENT     [facility] Outpatient Physical Therapy [place] [address] [facility], [address]  [place] [address]  Phone:  [phone]    Date: 05/01/2023    Patient: Jena Yepez  YOB: 1990  MRN: [id number]     Time Calculation    Start time: 0345  Stop time: 0430 Time Calculation (min): 45 minutes         Chief Complaint: Back Problem    Visit #: 5    SUBJECTIVE:  The patient reports that her back is feeling better; she exercised 2 days, but her (R) side of her ribcage is hurting    OBJECTIVE:  Current objective measures:       Decrease tenderness to the (R) costal cartilage at level T10 -T11    Therapeutic Exercises (CPT 56900):     1. knees to chest, 2 x20 sec    2. posterior pelvic tilts, 1 x10 reps    3. bridges, 1 x10 reps    4. piriformis stretch, 2 x30 sec    5. abdominal crunches, 1 x10 reps    6. oblique crunches    7. prone hip extension, 1 x 5 reps, difficulty with lifting hip in extensin from prone    8. quadriped UE/LE lifts    9. theraball roll-outs    10. prayer stretch/ deviated prayer stretch, 2 x20 sec    11. step ups    12. ball bridges, 1 x10 reps    13. quad kick backs    14. hamstring curls with theraball, 20x    Therapeutic Treatments and Modalities:     1. Manual Therapy (CPT 66354), Lumbar, CPA's grade 2-3 at L4-5; L3-4; L2-3  Time-based treatments/modalities:  Physical Therapy Timed Treatment Charges  Manual therapy minutes (CPT 38929): 15 minutes  Neuromusc re-ed, balance, coor, post minutes (CPT 18642): 15 minutes  Therapeutic exercise minutes (CPT 42453): 15 minutes    ASSESSMENT:   Response to treatment:   CPA's grade L4-5 with tenderness present at L4-5 > to the (R) side.  T11-T12 grade 3 to (R) side tenderness initially but dissipated as intervention proceeded.  Performed ball bridges with limited bilateral hip extension; fear avoidance of pain in low back per patient. Next visit: hip extension motion/  strengthening      PLAN/RECOMMENDATIONS:   Plan for treatment: therapy treatment to continue next visit.  Planned interventions for next visit: continue with current treatment.

## 2023-05-08 ENCOUNTER — PHYSICAL THERAPY (OUTPATIENT)
Dept: PHYSICAL THERAPY | Facility: REHABILITATION | Age: 33
End: 2023-05-08
Payer: COMMERCIAL

## 2023-05-08 DIAGNOSIS — M54.6 CHRONIC RIGHT-SIDED THORACIC BACK PAIN: ICD-10-CM

## 2023-05-08 DIAGNOSIS — G89.29 CHRONIC RIGHT-SIDED THORACIC BACK PAIN: ICD-10-CM

## 2023-05-08 PROCEDURE — 97112 NEUROMUSCULAR REEDUCATION: CPT

## 2023-05-08 PROCEDURE — 97110 THERAPEUTIC EXERCISES: CPT

## 2023-05-08 NOTE — OP THERAPY DAILY TREATMENT
Outpatient Physical Therapy  DAILY TREATMENT     [facility] Outpatient Physical Therapy [place] [address], [address]  [place] [address]  Phone:  [phone]    Date: 05/08/2023    Patient: Jena Yepez  YOB: 1990  MRN: [id number]     Time Calculation    Start time: 0345  Stop time: 0430 Time Calculation (min): 45 minutes         Chief Complaint: Back Problem    Visit #: 6    SUBJECTIVE:  The patient has less pain to her low back; she did not have too much discomfort while driving    OBJECTIVE:  Current objective measures:       Improve stability in trunk; decrease pain to (R) side    Therapeutic Exercises (CPT 31960):     1. knees to chest, 2 x20 sec    2. posterior pelvic tilts, 1 x10 reps    3. bridges, 1 x10 reps    4. piriformis stretch, 2 x30 sec    5. abdominal crunches, 1 x10 reps    6. oblique crunches    7. prone hip extension, 1 x 5 reps, difficulty with lifting hip in extensin from prone    8. quadriped UE/LE lifts, x    9. theraball roll-outs, 1 x10 reps    10. prayer stretch/ deviated prayer stretch, 2 x20 sec    11. step ups, x    12. ball bridges, 1 x10 reps    13. quad kick backs, x    14. hamstring curls with theraball, 20x    15. over the chair trunk extension    Therapeutic Treatments and Modalities:     1. Manual Therapy (CPT 36528), Lumbar, CPA's grade 2-3 at L4-5; L3-4; L2-3  Time-based treatments/modalities:    Physical Therapy Timed Treatment Charges  Neuromusc re-ed, balance, coor, post minutes (CPT 94970): 15 minutes  Therapeutic exercise minutes (CPT 67714): 30 minutes  ASSESSMENT:   Response to treatment:   Observed patient perform engagement of TrA and demonstrate increased AROM in trunk extension during bridges. Patient given self care education on performing reverse curls ups.; tactile cuing to correct posture. The patient tolerated prone rollout with tactile cuing    PLAN/RECOMMENDATIONS:   Plan for treatment: therapy treatment to continue next  visit.  Planned interventions for next visit: continue with current treatment.

## 2023-05-15 ENCOUNTER — PHYSICAL THERAPY (OUTPATIENT)
Dept: PHYSICAL THERAPY | Facility: REHABILITATION | Age: 33
End: 2023-05-15
Payer: COMMERCIAL

## 2023-05-15 DIAGNOSIS — G89.29 CHRONIC RIGHT-SIDED THORACIC BACK PAIN: ICD-10-CM

## 2023-05-15 DIAGNOSIS — M54.6 CHRONIC RIGHT-SIDED THORACIC BACK PAIN: ICD-10-CM

## 2023-05-15 PROCEDURE — 97110 THERAPEUTIC EXERCISES: CPT

## 2023-05-15 PROCEDURE — 97140 MANUAL THERAPY 1/> REGIONS: CPT

## 2023-05-15 NOTE — OP THERAPY DAILY TREATMENT
Outpatient Physical Therapy  DAILY TREATMENT     [facility] Outpatient Physical Therapy [place] [address] [facility], [address]  [place] [address]  Phone:  [phone]    Date: 05/15/2023    Patient: Jena Yepez  YOB: 1990  MRN: [id number]     Time Calculation    Start time: 0345  Stop time: 0430 Time Calculation (min): 45 minutes         Chief Complaint: Back Problem    Visit #: 7    SUBJECTIVE:  The patient has been having less connective tissue tension and stiffness to the low to mid back.     OBJECTIVE:  Current objective measures:       Decrease pain in thoracolumbar; improve mobility and strength    Therapeutic Exercises (CPT 24988):     1. cat cow stretch, 5 x20 sec    2. posterior pelvic tilts, 1 x10 reps    3. bridges, 1 x10 reps    4. shoulder retractions, 2 x30 sec    5. abdominal crunches, 1 x10 reps    6. oblique crunches    7. prone hip extension, 1 x 5 reps, difficulty with lifting hip in extensin from prone    8. quadriped UE/LE lifts, 1 x 5 reps, (L) wrist hurt    9. theraball roll-outs, 1 x10 reps    10. PNF D1/D2 flex/ext, 1 x 8 reps each side (green tb), x    12. ball bridges, 1 x10 reps    13. quad kick backs, x    14. hamstring curls with theraball, 20x    15. over the chair trunk extension    Therapeutic Treatments and Modalities:     1. Manual Therapy (CPT 12422), Lumbar, CPA's grade 2-3 at L4-5; L3-4; L2-3    Time-based treatments/modalities:  Physical Therapy Timed Treatment Charges  Manual therapy minutes (CPT 96333): 15 minutes  Therapeutic exercise minutes (CPT 76061): 30 minutes    ASSESSMENT:   Response to treatment:   CPA's grade 2-3 at L1-L5-T12; some tenderness to the (R) at L4-5 less superiorly to the (R) side going up to T12. Self care instruction for improving trunk stabilization in EO/ TrA's. Patient needed tactile cuing to complete full motion in flexion of UE's during PNF pattern      PLAN/RECOMMENDATIONS:   Plan for treatment: therapy treatment to  continue next visit.  Planned interventions for next visit: continue with current treatment.

## 2023-05-22 ENCOUNTER — PHYSICAL THERAPY (OUTPATIENT)
Dept: PHYSICAL THERAPY | Facility: REHABILITATION | Age: 33
End: 2023-05-22
Payer: COMMERCIAL

## 2023-05-22 DIAGNOSIS — M54.6 CHRONIC RIGHT-SIDED THORACIC BACK PAIN: ICD-10-CM

## 2023-05-22 DIAGNOSIS — G89.29 CHRONIC RIGHT-SIDED THORACIC BACK PAIN: ICD-10-CM

## 2023-05-22 PROCEDURE — 97110 THERAPEUTIC EXERCISES: CPT

## 2023-05-22 PROCEDURE — 97140 MANUAL THERAPY 1/> REGIONS: CPT

## 2023-05-22 PROCEDURE — 97112 NEUROMUSCULAR REEDUCATION: CPT

## 2023-05-22 NOTE — OP THERAPY DAILY TREATMENT
Outpatient Physical Therapy  DAILY TREATMENT     [facility] Outpatient Physical Therapy [place] [address] [facility], [address]  [place] [address]  Phone:  [phone]    Date: 05/22/2023    Patient: Jena Yepez  YOB: 1990  MRN: [id number]     Time Calculation    Start time: 0345  Stop time: 0430 Time Calculation (min): 45 minutes         Chief Complaint: Back Problem    Visit #: 8    SUBJECTIVE:  The patient is feeling better with less soreness to her low and mid back lately    OBJECTIVE:  Current objective measures:       Improve trunk stabilization strength in (R) trunk rotation    Therapeutic Exercises (CPT 21559):     1. cat cow stretch, 5 x20 sec    2. posterior pelvic tilts, 1 x10 reps    3. bridges, 1 x10 reps    4. shoulder retractions, 2 x30 sec    5. abdominal crunches, 1 x10 reps    6. oblique crunches    7. prone hip extension, 1 x 5 reps, difficulty with lifting hip in extensin from prone    8. quadriped UE/LE lifts, (L) wrist hurt    9. theraball roll-outs, 1 x10 reps    10. PNF D1/D2 flex/ext, 1 x 8 reps each side (green tb)    11. trunk rotation PLOF's, x    12. ball bridges, 1 x10 reps    13. quad kick backs, x    14. hamstring curls with theraball, 20x    15. over the chair trunk extension    Therapeutic Treatments and Modalities:     1. Manual Therapy (CPT 17200), Lumbar, CPA's grade 2-3 at L4-5; L3-4; L2-3    Time-based treatments/modalities:    Physical Therapy Timed Treatment Charges  Manual therapy minutes (CPT 78870): 15 minutes  Neuromusc re-ed, balance, coor, post minutes (CPT 57452): 15 minutes  Therapeutic exercise minutes (CPT 44519): 15 minutes    ASSESSMENT:   Response to treatment:   CPS's grade 3 at L2-3-4 increased tenderness to the (R) side with UPA's. Patient has difficulty with hip extension; trunk extension; does not indicate pain but has increased fear avoidance. The patient performed thera-ball rollouts with improved form using better motor control  through pelvic tilt and TrA activation/engagment    PLAN/RECOMMENDATIONS:   Plan for treatment: therapy treatment to continue next visit.  Planned interventions for next visit: continue with current treatment.

## 2023-05-30 ENCOUNTER — PHYSICAL THERAPY (OUTPATIENT)
Dept: PHYSICAL THERAPY | Facility: REHABILITATION | Age: 33
End: 2023-05-30
Payer: COMMERCIAL

## 2023-05-30 DIAGNOSIS — M54.6 CHRONIC RIGHT-SIDED THORACIC BACK PAIN: ICD-10-CM

## 2023-05-30 DIAGNOSIS — G89.29 CHRONIC RIGHT-SIDED THORACIC BACK PAIN: ICD-10-CM

## 2023-05-30 PROCEDURE — 97110 THERAPEUTIC EXERCISES: CPT

## 2023-05-30 PROCEDURE — 97140 MANUAL THERAPY 1/> REGIONS: CPT

## 2023-05-30 NOTE — OP THERAPY DAILY TREATMENT
Outpatient Physical Therapy  DAILY TREATMENT     [facility] Outpatient Physical Therapy [place]  [address], [address]  [place] [address]  Phone:  [phone]    Date: 05/30/2023    Patient: Jena Yepez  YOB: 1990  MRN: [id number]     Time Calculation                   Chief Complaint: No chief complaint on file.    Visit #: 9    SUBJECTIVE:  The patient reports soreness in the back and legs from playing softball.    OBJECTIVE:  Current objective measures:       Increase flexion and extension and (L) rotation with less pain; T12-L5 improve mobility by 1 grade    Therapeutic Exercises (CPT 46202):     1. cat cow stretch, 5 x20 sec    2. posterior pelvic tilts, 1 x10 reps    3. bridges, 1 x10 reps    4. shoulder retractions, 2 x30 sec    5. abdominal crunches, 1 x10 reps    6. oblique crunches    7. prone hip extension, 1 x 5 reps, difficulty with lifting hip in extensin from prone    8. quadriped UE/LE lifts, (L) wrist hurt    9. theraball roll-outs, 1 x10 reps    10. PNF D1/D2 flex/ext, 1 x 8 reps each side (green tb)    11. trunk rotation PLOF's, x    12. ball bridges, 1 x10 reps    13. quad kick backs, x    14. hamstring curls with theraball, 20x    15. over the chair trunk extension    Therapeutic Treatments and Modalities:     1. Manual Therapy (CPT 75658), Lumbar, CPA's grade 2-3 at L4-5; L3-4; L2-3    Time-based treatments/modalities:     ASSESSMENT:   Response to treatment:   CPA's grade 3-4 at L3-4-5 T-12 with less symptoms but more pain at L3-4 to (R) side  Self care instructions for improving thoracic mobilization with chair extensions and open book stretch patient had stiffness and limited trunk rotation to the (R) side reaching for (R) hand. Next visit: thoracic mobilizations in chair      PLAN/RECOMMENDATIONS:   Plan for treatment: therapy treatment to continue next visit.  Planned interventions for next visit: continue with current treatment.

## 2023-06-05 ENCOUNTER — PHYSICAL THERAPY (OUTPATIENT)
Dept: PHYSICAL THERAPY | Facility: REHABILITATION | Age: 33
End: 2023-06-05
Payer: COMMERCIAL

## 2023-06-05 DIAGNOSIS — M54.6 CHRONIC RIGHT-SIDED THORACIC BACK PAIN: ICD-10-CM

## 2023-06-05 DIAGNOSIS — G89.29 CHRONIC RIGHT-SIDED THORACIC BACK PAIN: ICD-10-CM

## 2023-06-05 PROCEDURE — 97140 MANUAL THERAPY 1/> REGIONS: CPT

## 2023-06-05 PROCEDURE — 97110 THERAPEUTIC EXERCISES: CPT

## 2023-06-05 NOTE — OP THERAPY DAILY TREATMENT
Outpatient Physical Therapy  DAILY TREATMENT     [facility] Outpatient Physical Therapy [place] [address] [facility], [address]  [place] [address]  Phone:  [phone]    Date: 06/05/2023    Patient: Jena Yepez  YOB: 1990  MRN: [id number]     Time Calculation                   Chief Complaint: No chief complaint on file.    Visit #: 10    SUBJECTIVE:  The patient has been feeling better not as stiff to the back like previously     OBJECTIVE:  Current objective measures:       Increase mobility in trunk rotation to the (L) side    Therapeutic Exercises (CPT 48914):     1. cat cow stretch, 5 x20 sec    2. posterior pelvic tilts, 1 x10 reps    3. bridges, 1 x10 reps    4. shoulder retractions, 2 x30 sec    5. abdominal crunches, 1 x10 reps    6. oblique crunches    7. prone hip extension, 1 x 5 reps, difficulty with lifting hip in extensin from prone    8. quadriped UE/LE lifts, (L) wrist hurt    9. theraball roll-outs, 1 x10 reps    10. PNF D1/D2 flex/ext, 1 x 8 reps each side (green tb)    11. trunk rotation PLOF's, x    12. ball bridges, 1 x10 reps    13. quad kick backs, x    14. hamstring curls with theraball, 20x    15. over the chair trunk extension    Therapeutic Treatments and Modalities:     1. Manual Therapy (CPT 99220), Lumbar, CPA's grade 2-3 at L4-5; L3-4; L2-3    Time-based treatments/modalities:     ASSESSMENT:   Response to treatment:   AROM in quadriped trunk rotations more limited to the (R) side compared to the (L) side. Performed PNF D1/D2 trunk chops and lifts with more engagement to the (L) side compared to the (R) side.         PLAN/RECOMMENDATIONS:   Plan for treatment: therapy treatment to continue next visit.  Planned interventions for next visit: continue with current treatment.

## 2023-06-12 ENCOUNTER — PHYSICAL THERAPY (OUTPATIENT)
Dept: PHYSICAL THERAPY | Facility: REHABILITATION | Age: 33
End: 2023-06-12
Payer: COMMERCIAL

## 2023-06-12 DIAGNOSIS — G89.29 CHRONIC RIGHT-SIDED THORACIC BACK PAIN: ICD-10-CM

## 2023-06-12 DIAGNOSIS — M54.6 CHRONIC RIGHT-SIDED THORACIC BACK PAIN: ICD-10-CM

## 2023-06-12 PROCEDURE — 97110 THERAPEUTIC EXERCISES: CPT

## 2023-06-12 PROCEDURE — 97112 NEUROMUSCULAR REEDUCATION: CPT

## 2023-06-12 PROCEDURE — 97140 MANUAL THERAPY 1/> REGIONS: CPT

## 2023-06-12 NOTE — OP THERAPY DAILY TREATMENT
Outpatient Physical Therapy  DAILY TREATMENT     [facility] Outpatient Physical Therapy [place] [address] [facility], [address]  [place] [address]  Phone:  [phone]    Date: 06/12/2023    Patient: Jena Yepez  YOB: 1990  MRN: [id number]     Time Calculation    Start time: 0300  Stop time: 0345 Time Calculation (min): 45 minutes         Chief Complaint: Back Problem    Visit #: 11    SUBJECTIVE:  The patient feels very stiff in the mid to low back area today. She walked a lot at work today.    OBJECTIVE:  Current objective measures:       Improve mobility in thoracic extension and rotation    Therapeutic Exercises (CPT 43377):     1. cat cow stretch, 5 x20 sec, xx    2. posterior pelvic tilts, 1 x10 reps    3. bridges, 1 x10 reps, xxx    4. shoulder retractions, 2 x30 sec    5. abdominal crunches, 1 x10 reps    6. oblique crunches, 1 x10 reps, xx    7. prone hip extension, 1 x 5 reps    8. quadriped UE/LE lifts, xxx    9. theraball roll-outs, 1 x10 reps    10. PNF D1/D2 flex/ext, 2 x  7 reps each side (green tb)    11. trunk rotation PLOF's, xxx    12. ball bridges, 1 x10 reps, xx    13. quad kick backs, x    14. hamstring curls with theraball, 20x    15. over the chair trunk extension, xxx    Therapeutic Treatments and Modalities:     1. Manual Therapy (CPT 59909), Lumbar, CPA's grade 2-3 at L4-5; L3-4; L2-3    Time-based treatments/modalities:    Physical Therapy Timed Treatment Charges  Manual therapy minutes (CPT 11513): 15 minutes  Neuromusc re-ed, balance, coor, post minutes (CPT 39078): 15 minutes  Therapeutic exercise minutes (CPT 23921): 15 minutes    ASSESSMENT:   Response to treatment:   CPA's grade 2 at L2 to the (R) side; painful; grade 2-3 from L1-L5 other-wise no pain bilaterally. Patient given strengthening exercise for obliques internal/external. Completed to fatigue response; difficulty with activating (R) obliques due to weakness during crossover crunches while in supine.        PLAN/RECOMMENDATIONS:   Plan for treatment: therapy treatment to continue next visit.  Planned interventions for next visit: continue with current treatment.

## 2023-07-17 ENCOUNTER — PHYSICAL THERAPY (OUTPATIENT)
Dept: PHYSICAL THERAPY | Facility: REHABILITATION | Age: 33
End: 2023-07-17
Payer: COMMERCIAL

## 2023-07-17 DIAGNOSIS — G89.29 CHRONIC RIGHT-SIDED THORACIC BACK PAIN: ICD-10-CM

## 2023-07-17 DIAGNOSIS — M54.6 CHRONIC RIGHT-SIDED THORACIC BACK PAIN: ICD-10-CM

## 2023-07-17 PROCEDURE — 97110 THERAPEUTIC EXERCISES: CPT

## 2023-07-17 PROCEDURE — 97112 NEUROMUSCULAR REEDUCATION: CPT

## 2023-07-17 NOTE — OP THERAPY DAILY TREATMENT
Outpatient Physical Therapy  DAILY TREATMENT     [facility] Outpatient Physical Therapy [place] [address], [address]  [place] [address]  Phone:  [phone]    Date: 07/17/2023    Patient: Jena Yepez  YOB: 1990  MRN: [id number]     Time Calculation    Start time: 0400  Stop time: 0435 Time Calculation (min): 35 minutes         Chief Complaint: Back Problem    Visit #: 12    SUBJECTIVE:  The patient reports being stiff in the mid back ans into the lower back. 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    OBJECTIVE:  Current objective measures:       Improve mobility in thoracic extension and rotation     Therapeutic Exercises (CPT 76911):     1. cat cow stretch, 5 x20 sec, xx    2. posterior pelvic tilts, 1 x10 reps    3. bridges, 1 x10 reps, xxx    4. shoulder retractions, 2 x30 sec    5. abdominal crunches, 1 x10 reps    6. oblique crunches, 1 x10 reps, xx    7. prone hip extension, 1 x 5 reps    8. quadriped UE/LE lifts, xxx    9. theraball roll-outs, 1 x10 reps    10. PNF D1/D2 flex/ext, 2 x  7 reps each side (green tb)    11. trunk rotation PLOF's, xxx    12. ball bridges, 1 x10 reps, xx    13. quad kick backs, x    14. hamstring curls with theraball, 20x    15. over the chair trunk extension, xxx    16. seated hamstring stretch    Therapeutic Treatments and Modalities:     1. Manual Therapy (CPT 90454), Lumbar, CPA's grade 2-3 at L4-5; L3-4; L2-3    Time-based treatments/modalities:    Physical Therapy Timed Treatment Charges  Manual therapy minutes (CPT 76486): 7 minutes  Neuromusc re-ed, balance, coor, post minutes (CPT 97054): 10 minutes  Therapeutic exercise minutes (CPT 80203): 18 minutes    ASSESSMENT:   Response to treatment:   UPA's grade 2 at L2 and L4-5 to (R) side with tenderness present; also at L4-5 to the (L) side grade 3. Manual static and contract relax techniques applied to the bilateral hamstring area in supine; (R) LE more  restricted than (L) side today. Discussed thoracic mobilizations self directed in chair and seated hamstring stretches. Patient tolerated well Reference sheet printed for patient.     PLAN/RECOMMENDATIONS:   Plan for treatment: therapy treatment to continue next visit.  Planned interventions for next visit: continue with current treatment.

## 2023-07-24 ENCOUNTER — PHYSICAL THERAPY (OUTPATIENT)
Dept: PHYSICAL THERAPY | Facility: REHABILITATION | Age: 33
End: 2023-07-24
Payer: COMMERCIAL

## 2023-07-24 DIAGNOSIS — M54.6 CHRONIC RIGHT-SIDED THORACIC BACK PAIN: ICD-10-CM

## 2023-07-24 DIAGNOSIS — G89.29 CHRONIC RIGHT-SIDED THORACIC BACK PAIN: ICD-10-CM

## 2023-07-24 PROCEDURE — 97140 MANUAL THERAPY 1/> REGIONS: CPT

## 2023-07-24 PROCEDURE — 97110 THERAPEUTIC EXERCISES: CPT

## 2023-07-24 PROCEDURE — 97112 NEUROMUSCULAR REEDUCATION: CPT

## 2023-07-24 NOTE — OP THERAPY DAILY TREATMENT
Outpatient Physical Therapy  DAILY TREATMENT     [facility] Outpatient Physical Therapy [place]  [address]  [place] [address]  Phone:  [phone]    Date: 07/24/2023    Patient: Jena Yepez  YOB: 1990  MRN: [id number]     Time Calculation    Start time: 0345  Stop time: 0430 Time Calculation (min): 45 minutes         Chief Complaint: Back Problem    Visit #: 13    SUBJECTIVE:  The patient has been riding a zamboni to lean with  and she detected more soreness to her low back     OBJECTIVE:  Current objective measures:       Improve mobility at lumbar-thoracic    Therapeutic Exercises (CPT 69851):     1. cat cow stretch, 5 x20 sec, xx    2. posterior pelvic tilts, 1 x10 reps    3. bridges, 1 x10 reps, xxx    4. shoulder retractions, 2 x30 sec    5. abdominal crunches, 1 x10 reps    6. oblique crunches, 1 x10 reps    7. prone hip extension, 1 x 10 reps    8. quadriped UE/LE lifts, 1 x 5 reps    9. theraball roll-outs, NT    10. PNF D1/D2 flex/ext, 2 x  7 reps each side (green tb)    12. ball bridges, 1 x10 reps, xx, x    14. hamstring curls with theraball, 20x    15. over the chair trunk extension, 10x, xxx    17. oblique theraball crunches, 1 x10 reps, (R) side weakness    18. supine QL stretch, 3 x30 sec    Therapeutic Treatments and Modalities:     1. Manual Therapy (CPT 57689), Lumbar, CPA's grade 2-3 at L4-5; L3-4; L2-3    Time-based treatments/modalities:    Physical Therapy Timed Treatment Charges  Manual therapy minutes (CPT 52239): 15 minutes  Neuromusc re-ed, balance, coor, post minutes (CPT 83852): 15 minutes  Therapeutic exercise minutes (CPT 55882): 15 minutes    ASSESSMENT:   Response to treatment:   CPA's at T12 and L4-5 bilaterally with tenderness to L4-5. Described less restriction in AROM in trunk rotation (R) following intervention today. Self care instructions for oblique strengthening using 65 cm thera-ball from side-lying; more difficulty side-lying  (R) with balance and weakness.      PLAN/RECOMMENDATIONS:   Plan for treatment: therapy treatment to continue next visit.  Planned interventions for next visit: continue with current treatment.

## 2023-08-06 ENCOUNTER — OFFICE VISIT (OUTPATIENT)
Dept: URGENT CARE | Facility: CLINIC | Age: 33
End: 2023-08-06
Payer: COMMERCIAL

## 2023-08-06 VITALS
DIASTOLIC BLOOD PRESSURE: 64 MMHG | TEMPERATURE: 98 F | WEIGHT: 147 LBS | BODY MASS INDEX: 27.05 KG/M2 | HEART RATE: 86 BPM | RESPIRATION RATE: 18 BRPM | OXYGEN SATURATION: 97 % | SYSTOLIC BLOOD PRESSURE: 118 MMHG | HEIGHT: 62 IN

## 2023-08-06 DIAGNOSIS — H11.151 PINGUECULA OF RIGHT EYE: ICD-10-CM

## 2023-08-06 PROCEDURE — 3074F SYST BP LT 130 MM HG: CPT | Performed by: NURSE PRACTITIONER

## 2023-08-06 PROCEDURE — 99213 OFFICE O/P EST LOW 20 MIN: CPT | Performed by: NURSE PRACTITIONER

## 2023-08-06 PROCEDURE — 3078F DIAST BP <80 MM HG: CPT | Performed by: NURSE PRACTITIONER

## 2023-08-06 ASSESSMENT — VISUAL ACUITY: OU: 1

## 2023-08-06 ASSESSMENT — ENCOUNTER SYMPTOMS
EYE ITCHING: 0
FOREIGN BODY SENSATION: 0
BLURRED VISION: 0
EYE REDNESS: 1
PHOTOPHOBIA: 0

## 2023-08-06 ASSESSMENT — FIBROSIS 4 INDEX: FIB4 SCORE: 0.7

## 2023-08-07 ENCOUNTER — PHYSICAL THERAPY (OUTPATIENT)
Dept: PHYSICAL THERAPY | Facility: REHABILITATION | Age: 33
End: 2023-08-07
Payer: COMMERCIAL

## 2023-08-07 DIAGNOSIS — M54.6 CHRONIC RIGHT-SIDED THORACIC BACK PAIN: ICD-10-CM

## 2023-08-07 DIAGNOSIS — G89.29 CHRONIC RIGHT-SIDED THORACIC BACK PAIN: ICD-10-CM

## 2023-08-07 PROCEDURE — 97110 THERAPEUTIC EXERCISES: CPT

## 2023-08-07 PROCEDURE — 97140 MANUAL THERAPY 1/> REGIONS: CPT

## 2023-08-07 PROCEDURE — 97112 NEUROMUSCULAR REEDUCATION: CPT

## 2023-08-07 NOTE — OP THERAPY DAILY TREATMENT
Outpatient Physical Therapy  DAILY TREATMENT     [facility] Outpatient Physical Therapy [place] [id number] [facility], [address]  [place] [address]  Phone:  [phone]    Date: 08/07/2023    Patient: Jena Yepez  YOB: 1990  MRN: [id number]     Time Calculation    Start time: 0345  Stop time: 0415 Time Calculation (min): 30 minutes         Chief Complaint: Back Problem    Visit #: 14    SUBJECTIVE: The patient has been feeling better; less pain walking around at work.    OBJECTIVE:  Current objective measures:       Improve trunk rotation during functional movements    Therapeutic Exercises (CPT 63139):     1. cat cow stretch, 5 x20 sec, xx    2. posterior pelvic tilts, 1 x10 reps    3. bridges, 1 x10 reps    4. shoulder retractions, 2 x30 sec    5. abdominal crunches, 1 x10 reps    6. oblique crunches, 1 x10 reps    7. prone hip extension, 1 x 10 reps    8. quadriped UE/LE lifts, 1 x 5 reps    9. theraball roll-outs, NT    10. PNF D1/D2 flex/ext, 2 x  7 reps each side (green tb)    11. lunges with twist, xx    12. ball bridges, 1 x10 reps    13. ball twists standing, x    14. hamstring curls with theraball, 20x    15. over the chair trunk extension, 10x, xxx    16. leg press, 4B at 20 reps    17. oblique theraball crunches, 1 x10 reps, (R) side weakness    18. supine QL stretch, 3 x30 sec    Therapeutic Treatments and Modalities:     1. Manual Therapy (CPT 52611), Lumbar, CPA's grade 2-3 at L4-5; L3-4; L2-3    Time-based treatments/modalities:    Physical Therapy Timed Treatment Charges  Manual therapy minutes (CPT 91540): 15 minutes  Neuromusc re-ed, balance, coor, post minutes (CPT 83137): 15 minutes  Therapeutic exercise minutes (CPT 14250): 15 minutes    ASSESSMENT:   Response to treatment:   CPA's grade 3 at L3 with tenderness to the (R) side. Patient has improved active motion in (L) trunk rotation. AROM in trunk rotation (R) was more restricted from reported tightness to the (R) side  during lunging motions; difficulty with kneeling over (R) LE and rotating (L) at the trunk; LOB     PLAN/RECOMMENDATIONS:   Plan for treatment: therapy treatment to continue next visit.  Planned interventions for next visit: continue with current treatment.

## 2023-08-07 NOTE — PROGRESS NOTES
Subjective:   Jena Yepez is a 32 y.o. female who presents for Eye Problem (HAS A BUMP NOTICED IT BETWEEN YESTERDAY AND TODAY )      Eye Problem   The right eye is affected. This is a new problem. The current episode started today. The problem occurs constantly. The problem has been unchanged. There was no injury mechanism. The pain is moderate. There is No known exposure to pink eye. Associated symptoms include eye redness. Pertinent negatives include no blurred vision, foreign body sensation, itching or photophobia. The treatment provided no relief.       Review of Systems   Eyes:  Positive for redness. Negative for blurred vision, photophobia and itching.       Medications:    This patient does not have an active medication from one of the medication groupers.    Allergies: Patient has no known allergies.    Problem List: Jena Yepez does not have any pertinent problems on file.    Surgical History:  Past Surgical History:   Procedure Laterality Date    TUBAL COAGULATION LAPAROSCOPIC BILATERAL  07/16/2016    Procedure: TUBAL COAGULATION LAPAROSCOPIC BILATERAL , For: Possible BILATERAL Salpingectomy using Deepak Port and Harmonic Scalpel, Possible open;  Surgeon: Robb Fuentes M.D.;  Location: SURGERY [facility];  Service:     ILIAC BONE GRAFT  02/23/2011    Performed by CARTER ZELAYA at SURGERY [facility] ORS    LUMBAR DECOMPRESSION  02/23/2011    Performed by CARTER ZELAYA at SURGERY [facility] ORS    THORACIC FUSION POSTERIOR  02/23/2011    Performed by CARTER ZELAYA at SURGERY [facility] ORS    EXPLORATORY LAPAROTOMY  02/20/2011    Performed by ROBB ARGUELLES at SURGERY [facility] ORS    APPENDECTOMY  02/20/2011    Performed by ROBB ARGUELLES at SURGERY [facility] ORS    BOWEL RESECTION  02/20/2011    Performed by ROBB ARGUELLES at SURGERY [facility] ORS    TUBAL COAGULATION LAPAROSCOPIC BILATERAL         Past Social Hx: Jena Bruce  "Marleni  reports that she has never smoked. She has never used smokeless tobacco. She reports current alcohol use. She reports that she does not use drugs.     Past Family Hx:  Jena Yepez family history includes Cancer in her maternal aunt.     Problem list, medications, and allergies reviewed by myself today in Epic.     Objective:     /64 (BP Location: Left arm, Patient Position: Sitting)   Pulse 86   Temp 36.7 °C (98 °F) (Temporal)   Resp 18   Ht 1.575 m (5' 2\")   Wt 66.7 kg (147 lb)   SpO2 97%   BMI 26.89 kg/m²     Physical Exam  Constitutional:       Appearance: Normal appearance. She is not ill-appearing or toxic-appearing.   HENT:      Head: Normocephalic.      Right Ear: External ear normal.      Left Ear: External ear normal.      Nose: Nose normal.      Mouth/Throat:      Lips: Pink.   Eyes:      General: Lids are normal. Vision grossly intact. Gaze aligned appropriately.         Right eye: No discharge.      Conjunctiva/sclera:      Right eye: Right conjunctiva is injected.      Pupils: Pupils are equal, round, and reactive to light.      Slit lamp exam:     Right eye: No corneal ulcer or photophobia.        Comments: Clear appearing membrane inner conjunctiva , fluorescein and proparacaine applied   Pulmonary:      Effort: Pulmonary effort is normal. No accessory muscle usage.   Musculoskeletal:      Cervical back: Full passive range of motion without pain.   Neurological:      Mental Status: She is alert and oriented to person, place, and time.   Psychiatric:         Mood and Affect: Mood normal.         Thought Content: Thought content normal.         Assessment/Plan:     Diagnosis and associated orders:     1. Pinguecula of right eye  Referral to Ophthalmology           Comments/MDM:     I personally reviewed prior external notes and prior test results pertinent to today's visit.  Clinical exam appears to be benign recommended over-the-counter artificial tears " .  Chief  Discussed management options, risks and benefits, and alternatives to treatment plan agreed upon.   Red flags discussed and indications to immediately call 911 or present to the Emergency Department.   Supportive care, differential diagnoses, and indications for immediate follow-up discussed with patient.    Patient expresses understanding and agrees to plan. Patient denies any other questions or concerns.                Please note that this dictation was created using voice recognition software. I have made a reasonable attempt to correct obvious errors, but I expect that there are errors of grammar and possibly content that I did not discover before finalizing the note.    This note was electronically signed by Ethan GRIFFIN.

## 2023-09-05 ENCOUNTER — PHYSICAL THERAPY (OUTPATIENT)
Dept: PHYSICAL THERAPY | Facility: REHABILITATION | Age: 33
End: 2023-09-05
Payer: COMMERCIAL

## 2023-09-05 DIAGNOSIS — G89.29 CHRONIC RIGHT-SIDED THORACIC BACK PAIN: ICD-10-CM

## 2023-09-05 DIAGNOSIS — M54.6 CHRONIC RIGHT-SIDED THORACIC BACK PAIN: ICD-10-CM

## 2023-09-05 PROCEDURE — 97140 MANUAL THERAPY 1/> REGIONS: CPT

## 2023-09-05 PROCEDURE — 97112 NEUROMUSCULAR REEDUCATION: CPT

## 2023-09-05 PROCEDURE — 97110 THERAPEUTIC EXERCISES: CPT

## 2023-09-05 NOTE — OP THERAPY DAILY TREATMENT
Outpatient Physical Therapy  DAILY TREATMENT     [facility] Outpatient Physical Therapy [place]  [address], [address]  [place] [address]  Phone:  [phone]    Date: 09/05/2023    Patient: Jena Yepez  YOB: 1990  MRN: [id number]     Time Calculation    Start time: 0415  Stop time: 0500 Time Calculation (min): 45 minutes         Chief Complaint: Back Problem    Visit #: 15    SUBJECTIVE:  The patient continues to have increased tenderness to the mid to lower back.    OBJECTIVE:  Current objective measures:       Improve trunk stabilization strength; PRE's    Therapeutic Exercises (CPT 53407):     1. cat cow stretch, 5 x20 sec, xx    2. posterior pelvic tilts, 1 x10 reps, x    3. abdominal ball passes with UE/LE's, 1 x10 reps, zz    4. shoulder retractions, 2 x30 sec    5. abdominal crunches, 1 x10 reps, x    6. oblique crunches, 1 x10 reps    7. prone hip extension, 1 x 10 reps, x    8. quadriped UE/LE lifts, 1 x 5 reps    9. jackknife with theraball, NT, zz    10. PNF D1/D2 flex/ext, 2 x  7 reps each side (green tb)    11. lunges with twist, xx    12. ball bridges, 1 x10 reps, x    13. prone roll outs    14. hamstring curls with theraball, 20x    15. prone trunk extensions with ball, 1 x 10 reps    16. leg press, 4B at 20 reps    17. oblique theraball crunches, 1 x10 reps, (R) side weakness;    18. supine QL stretch, 3 x30 sec    19. side planks with ball, zz    20. front planks with ball, 1 x 23 sec; 1 x 20 sec, zz    Therapeutic Treatments and Modalities:     1. Manual Therapy (CPT 06911), Lumbar, CPA's grade 2-3 at L4-5; L3-4; L2-3    Time-based treatments/modalities:    Physical Therapy Timed Treatment Charges  Manual therapy minutes (CPT 97532): 15 minutes  Neuromusc re-ed, balance, coor, post minutes (CPT 71350): 15 minutes  Therapeutic exercise minutes (CPT 55740): 15 minutes  ASSESSMENT:   Response to treatment:   CPA's grade 2-3 with tenderness at L3 to (L) side and (2-3  to (R) side; patient had less stiffness in trunk rotations following during exercise with resistance involving rotations to (L) side. Self care instructions using thera-ball for more trunk stabilization exercises.      PLAN/RECOMMENDATIONS:   Plan for treatment: therapy treatment to continue next visit.  Planned interventions for next visit: continue with current treatment.

## 2023-12-29 ENCOUNTER — OFFICE VISIT (OUTPATIENT)
Dept: URGENT CARE | Facility: PHYSICIAN GROUP | Age: 33
End: 2023-12-29
Payer: COMMERCIAL

## 2023-12-29 VITALS
SYSTOLIC BLOOD PRESSURE: 124 MMHG | WEIGHT: 150 LBS | HEIGHT: 62 IN | OXYGEN SATURATION: 100 % | RESPIRATION RATE: 16 BRPM | DIASTOLIC BLOOD PRESSURE: 82 MMHG | TEMPERATURE: 97.9 F | HEART RATE: 99 BPM | BODY MASS INDEX: 27.6 KG/M2

## 2023-12-29 DIAGNOSIS — R07.89 TIGHTNESS IN CHEST: ICD-10-CM

## 2023-12-29 DIAGNOSIS — J45.20 MILD INTERMITTENT REACTIVE AIRWAY DISEASE WITHOUT COMPLICATION: ICD-10-CM

## 2023-12-29 DIAGNOSIS — R05.1 ACUTE COUGH: ICD-10-CM

## 2023-12-29 DIAGNOSIS — J06.9 UPPER RESPIRATORY INFECTION, ACUTE: Primary | ICD-10-CM

## 2023-12-29 PROCEDURE — 99214 OFFICE O/P EST MOD 30 MIN: CPT | Mod: 25 | Performed by: NURSE PRACTITIONER

## 2023-12-29 PROCEDURE — 94640 AIRWAY INHALATION TREATMENT: CPT | Performed by: NURSE PRACTITIONER

## 2023-12-29 PROCEDURE — 3074F SYST BP LT 130 MM HG: CPT | Performed by: NURSE PRACTITIONER

## 2023-12-29 PROCEDURE — 3079F DIAST BP 80-89 MM HG: CPT | Performed by: NURSE PRACTITIONER

## 2023-12-29 RX ORDER — ALBUTEROL SULFATE 90 UG/1
1-2 AEROSOL, METERED RESPIRATORY (INHALATION) EVERY 4 HOURS PRN
Qty: 1 EACH | Refills: 0 | Status: SHIPPED | OUTPATIENT
Start: 2023-12-29 | End: 2024-03-13

## 2023-12-29 RX ORDER — BENZONATATE 100 MG/1
100 CAPSULE ORAL 3 TIMES DAILY PRN
Qty: 30 CAPSULE | Refills: 0 | Status: SHIPPED | OUTPATIENT
Start: 2023-12-29 | End: 2024-03-13

## 2023-12-29 RX ORDER — ALBUTEROL SULFATE 2.5 MG/3ML
2.5 SOLUTION RESPIRATORY (INHALATION) ONCE
Status: COMPLETED | OUTPATIENT
Start: 2023-12-29 | End: 2023-12-29

## 2023-12-29 RX ADMIN — ALBUTEROL SULFATE 2.5 MG: 2.5 SOLUTION RESPIRATORY (INHALATION) at 16:12

## 2023-12-29 ASSESSMENT — FIBROSIS 4 INDEX: FIB4 SCORE: 0.72
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
## 2024-01-22 DIAGNOSIS — E55.9 VITAMIN D DEFICIENCY: ICD-10-CM

## 2024-01-22 DIAGNOSIS — Z13.0 SCREENING FOR DEFICIENCY ANEMIA: ICD-10-CM

## 2024-01-22 DIAGNOSIS — Z13.29 THYROID DISORDER SCREEN: ICD-10-CM

## 2024-01-22 DIAGNOSIS — Z13.6 SCREENING FOR CARDIOVASCULAR CONDITION: ICD-10-CM

## 2024-03-06 ENCOUNTER — HOSPITAL ENCOUNTER (OUTPATIENT)
Dept: LAB | Facility: MEDICAL CENTER | Age: 34
End: 2024-03-06
Payer: COMMERCIAL

## 2024-03-06 DIAGNOSIS — Z13.29 THYROID DISORDER SCREEN: ICD-10-CM

## 2024-03-06 DIAGNOSIS — Z13.6 SCREENING FOR CARDIOVASCULAR CONDITION: ICD-10-CM

## 2024-03-06 DIAGNOSIS — Z13.0 SCREENING FOR DEFICIENCY ANEMIA: ICD-10-CM

## 2024-03-06 DIAGNOSIS — E55.9 VITAMIN D DEFICIENCY: ICD-10-CM

## 2024-03-06 LAB
25(OH)D3 SERPL-MCNC: 21 NG/ML (ref 30–100)
ALBUMIN SERPL BCP-MCNC: 4.2 G/DL (ref 3.2–4.9)
ALBUMIN/GLOB SERPL: 1.4 G/DL
ALP SERPL-CCNC: 67 U/L (ref 30–99)
ALT SERPL-CCNC: 6 U/L (ref 2–50)
ANION GAP SERPL CALC-SCNC: 13 MMOL/L (ref 7–16)
AST SERPL-CCNC: 15 U/L (ref 12–45)
BILIRUB SERPL-MCNC: 0.7 MG/DL (ref 0.1–1.5)
BUN SERPL-MCNC: 10 MG/DL (ref 8–22)
CALCIUM ALBUM COR SERPL-MCNC: 8.9 MG/DL (ref 8.5–10.5)
CALCIUM SERPL-MCNC: 9.1 MG/DL (ref 8.5–10.5)
CHLORIDE SERPL-SCNC: 109 MMOL/L (ref 96–112)
CHOLEST SERPL-MCNC: 164 MG/DL (ref 100–199)
CO2 SERPL-SCNC: 21 MMOL/L (ref 20–33)
CREAT SERPL-MCNC: 0.55 MG/DL (ref 0.5–1.4)
ERYTHROCYTE [DISTWIDTH] IN BLOOD BY AUTOMATED COUNT: 40.4 FL (ref 35.9–50)
GFR SERPLBLD CREATININE-BSD FMLA CKD-EPI: 122 ML/MIN/1.73 M 2
GLOBULIN SER CALC-MCNC: 3 G/DL (ref 1.9–3.5)
GLUCOSE SERPL-MCNC: 81 MG/DL (ref 65–99)
HCT VFR BLD AUTO: 40.1 % (ref 37–47)
HDLC SERPL-MCNC: 46 MG/DL
HGB BLD-MCNC: 13.3 G/DL (ref 12–16)
IRON SATN MFR SERPL: 27 % (ref 15–55)
IRON SERPL-MCNC: 81 UG/DL (ref 40–170)
LDLC SERPL CALC-MCNC: 104 MG/DL
MCH RBC QN AUTO: 29.1 PG (ref 27–33)
MCHC RBC AUTO-ENTMCNC: 33.2 G/DL (ref 32.2–35.5)
MCV RBC AUTO: 87.7 FL (ref 81.4–97.8)
PLATELET # BLD AUTO: 243 K/UL (ref 164–446)
PMV BLD AUTO: 10.3 FL (ref 9–12.9)
POTASSIUM SERPL-SCNC: 4.2 MMOL/L (ref 3.6–5.5)
PROT SERPL-MCNC: 7.2 G/DL (ref 6–8.2)
RBC # BLD AUTO: 4.57 M/UL (ref 4.2–5.4)
SODIUM SERPL-SCNC: 143 MMOL/L (ref 135–145)
TIBC SERPL-MCNC: 298 UG/DL (ref 250–450)
TRIGL SERPL-MCNC: 70 MG/DL (ref 0–149)
TSH SERPL DL<=0.005 MIU/L-ACNC: 1.09 UIU/ML (ref 0.38–5.33)
UIBC SERPL-MCNC: 217 UG/DL (ref 110–370)
WBC # BLD AUTO: 7.1 K/UL (ref 4.8–10.8)

## 2024-03-06 PROCEDURE — 83550 IRON BINDING TEST: CPT

## 2024-03-06 PROCEDURE — 82306 VITAMIN D 25 HYDROXY: CPT

## 2024-03-06 PROCEDURE — 85027 COMPLETE CBC AUTOMATED: CPT

## 2024-03-06 PROCEDURE — 83540 ASSAY OF IRON: CPT

## 2024-03-06 PROCEDURE — 84443 ASSAY THYROID STIM HORMONE: CPT

## 2024-03-06 PROCEDURE — 80061 LIPID PANEL: CPT

## 2024-03-06 PROCEDURE — 36415 COLL VENOUS BLD VENIPUNCTURE: CPT

## 2024-03-06 PROCEDURE — 80053 COMPREHEN METABOLIC PANEL: CPT

## 2024-03-13 ENCOUNTER — OFFICE VISIT (OUTPATIENT)
Dept: MEDICAL GROUP | Facility: PHYSICIAN GROUP | Age: 34
End: 2024-03-13
Payer: COMMERCIAL

## 2024-03-13 VITALS
SYSTOLIC BLOOD PRESSURE: 108 MMHG | WEIGHT: 141.4 LBS | BODY MASS INDEX: 26.02 KG/M2 | HEIGHT: 62 IN | TEMPERATURE: 97.7 F | RESPIRATION RATE: 20 BRPM | DIASTOLIC BLOOD PRESSURE: 70 MMHG | OXYGEN SATURATION: 99 % | HEART RATE: 92 BPM

## 2024-03-13 DIAGNOSIS — E55.9 VITAMIN D DEFICIENCY: ICD-10-CM

## 2024-03-13 DIAGNOSIS — E78.00 PURE HYPERCHOLESTEROLEMIA: ICD-10-CM

## 2024-03-13 DIAGNOSIS — Z23 NEED FOR VACCINATION: ICD-10-CM

## 2024-03-13 PROCEDURE — 99395 PREV VISIT EST AGE 18-39: CPT

## 2024-03-13 PROCEDURE — 3078F DIAST BP <80 MM HG: CPT

## 2024-03-13 PROCEDURE — 3074F SYST BP LT 130 MM HG: CPT

## 2024-03-13 RX ORDER — M-VIT,TX,IRON,MINS/CALC/FOLIC 27MG-0.4MG
1 TABLET ORAL DAILY
COMMUNITY

## 2024-03-13 ASSESSMENT — PATIENT HEALTH QUESTIONNAIRE - PHQ9: CLINICAL INTERPRETATION OF PHQ2 SCORE: 0

## 2024-03-13 ASSESSMENT — FIBROSIS 4 INDEX: FIB4 SCORE: 0.88

## 2024-03-13 NOTE — PROGRESS NOTES
Verbal consent was acquired by the patient to use [employer] ambient listening note generation during this visit     Subjective:     CC: Diagnoses of Pure hypercholesterolemia, Vitamin D deficiency, and Need for vaccination were pertinent to this visit.    HPI:   Jena presents today with    History of Present Illness  The patient presents for lab review and routine wellness.    She had chickenpox when she was a kid. She sees gynecology for Pap smears. She had a colposcopy on 2024. She takes over-the-counter multivitamins. She has no new allergies. She has not started smoking. She has no new surgeries. She has no concerns for sexually transmitted infections. She tries to eat a variety of fresh fruits and vegetables most days. She eats a variety of meats. She does not eat fast food or junk food. She drinks soda once a week. She does not do much exercise. She has no substance abuse. She has no concerns with abuse in her home. She wears seatbelt and helmet. She has guns that are safe, locked, and secured. She uses sunscreen when she is outside.   The patient denies smoking.   She has no new family history or problems since her last visit.   The patient has no known allergies.    Problem   Vitamin D Deficiency   Pure Hypercholesterolemia       Health Maintenance: Completed  Cancer screening:   Cervical cancer screenin2024    Infectious disease screening/Immunizaitons  -STI screening: declines  Practices safe sex.    -Immunizaitons:   Influenza: 3/13/24  Tetanus: up-to-date: 3/13/24  Anticipatory Guidance:  Elicits she is eating a variety of fresh veggies/fruits most days, variety meats,   limited fast food/junk food/soda  Exercise: recommended 150 minutes/week: nothing currently  Substance Abuse: no  Safe in relationship. N/a  Seat belts, bike helmet, gun safety discussed.  Sun protection used.    ROS:  Review of Systems   All other systems reviewed and are negative.      Objective:     Exam:  /70  "(BP Location: Right arm, Patient Position: Sitting, BP Cuff Size: Adult)   Pulse 92   Temp 36.5 °C (97.7 °F) (Temporal)   Resp 20   Ht 1.575 m (5' 2\")   Wt 64.1 kg (141 lb 6.4 oz)   SpO2 99%   BMI 25.86 kg/m²  Body mass index is 25.86 kg/m².    Physical Exam  Vitals reviewed.   Constitutional:       General: She is not in acute distress.     Appearance: Normal appearance. She is not ill-appearing.   HENT:      Head: Normocephalic and atraumatic.   Cardiovascular:      Rate and Rhythm: Normal rate.      Pulses: Normal pulses.   Pulmonary:      Effort: Pulmonary effort is normal. No respiratory distress.   Skin:     General: Skin is warm and dry.      Findings: No rash.   Neurological:      General: No focal deficit present.      Mental Status: She is alert and oriented to person, place, and time.   Psychiatric:         Mood and Affect: Mood normal.         Behavior: Behavior normal.         Labs:    Latest Reference Range & Units 03/06/24 08:30   WBC 4.8 - 10.8 K/uL 7.1   RBC 4.20 - 5.40 M/uL 4.57   Hemoglobin 12.0 - 16.0 g/dL 13.3   Hematocrit 37.0 - 47.0 % 40.1   MCV 81.4 - 97.8 fL 87.7   MCH 27.0 - 33.0 pg 29.1   MCHC 32.2 - 35.5 g/dL 33.2   RDW 35.9 - 50.0 fL 40.4   Platelet Count 164 - 446 K/uL 243   MPV 9.0 - 12.9 fL 10.3   Sodium 135 - 145 mmol/L 143   Potassium 3.6 - 5.5 mmol/L 4.2   Chloride 96 - 112 mmol/L 109   Co2 20 - 33 mmol/L 21   Anion Gap 7.0 - 16.0  13.0   Glucose 65 - 99 mg/dL 81   Bun 8 - 22 mg/dL 10   Creatinine 0.50 - 1.40 mg/dL 0.55   GFR (CKD-EPI) >60 mL/min/1.73 m 2 122   Calcium 8.5 - 10.5 mg/dL 9.1   Correct Calcium 8.5 - 10.5 mg/dL 8.9   AST(SGOT) 12 - 45 U/L 15   ALT(SGPT) 2 - 50 U/L 6   Alkaline Phosphatase 30 - 99 U/L 67   Total Bilirubin 0.1 - 1.5 mg/dL 0.7   Albumin 3.2 - 4.9 g/dL 4.2   Total Protein 6.0 - 8.2 g/dL 7.2   Globulin 1.9 - 3.5 g/dL 3.0   A-G Ratio g/dL 1.4   Iron 40 - 170 ug/dL 81   Total Iron Binding 250 - 450 ug/dL 298   % Saturation 15 - 55 % 27   Unsat Iron " Binding 110 - 370 ug/dL 217   Cholesterol,Tot 100 - 199 mg/dL 164   Triglycerides 0 - 149 mg/dL 70   HDL >=40 mg/dL 46   LDL <100 mg/dL 104 (H)   25-Hydroxy   Vitamin D 25 30 - 100 ng/mL 21 (L)   TSH 0.380 - 5.330 uIU/mL 1.090   (H): Data is abnormally high  (L): Data is abnormally low    Assessment & Plan:     33 y.o. female with the following -     Assessment & Plan  1. Routine wellness visit.  Her blood pressure is good. I recommend 150 minutes a week of cardiovascular exercise. She will receive her influenza and tetanus vaccines today.    2. Elevated cholesterol.  Her LDL is slightly elevated at 104. She will continue to eat healthy foods and get regular exercise.    3. Vitamin D deficiency.  Her vitamin D is low at 21. I recommend vitamin D3 5000 IU 1 tablet daily.    4. Depression.  I will increase her fluoxetine to 20 mg 1 tablet daily.    5. Health maintenance.  Her hemoglobin and hematocrit have returned to normal. She will receive her influenza and tetanus vaccines today.    Follow-up  The patient will follow up in 1 year for a routine wellness visit.    Problem List Items Addressed This Visit          Family Medicine Problems    Vitamin D deficiency     Chronic, unstable. recommend once daily vit d3 otc paired with calcium to optimize absorption.              Other    Pure hypercholesterolemia     Chronic, unstable. Discussed healthy lifestyle recommendations.               Other Visit Diagnoses       Need for vaccination        Relevant Orders    Influenza Vaccine, High Dose (65+ Only)    Tdap Vaccine =>8YO IM           Supportive care, differential diagnoses, and indications for immediate follow-up discussed with patient.    Pathogenesis of diagnosis discussed including typical length and natural progression.    Instructed to return to clinic or nearest emergency department for any change in condition, further concerns, or worsening of symptoms.  Patient states understanding of the plan of care and  discharge instructions.    Return in about 1 year (around 3/13/2025), or if symptoms worsen or fail to improve, for wellness.    Please note that this dictation was created using voice recognition software. I have made every reasonable attempt to correct obvious errors, but I expect that there are errors of grammar and possibly content that I did not discover before finalizing the note.

## 2024-04-30 ENCOUNTER — OFFICE VISIT (OUTPATIENT)
Dept: URGENT CARE | Facility: PHYSICIAN GROUP | Age: 34
End: 2024-04-30
Payer: COMMERCIAL

## 2024-04-30 VITALS
DIASTOLIC BLOOD PRESSURE: 54 MMHG | HEIGHT: 62 IN | TEMPERATURE: 98.4 F | RESPIRATION RATE: 16 BRPM | BODY MASS INDEX: 26.68 KG/M2 | OXYGEN SATURATION: 98 % | HEART RATE: 97 BPM | SYSTOLIC BLOOD PRESSURE: 108 MMHG | WEIGHT: 145 LBS

## 2024-04-30 DIAGNOSIS — H10.31 ACUTE CONJUNCTIVITIS OF RIGHT EYE, UNSPECIFIED ACUTE CONJUNCTIVITIS TYPE: ICD-10-CM

## 2024-04-30 RX ORDER — POLYMYXIN B SULFATE AND TRIMETHOPRIM 1; 10000 MG/ML; [USP'U]/ML
1 SOLUTION OPHTHALMIC EVERY 4 HOURS
Qty: 10 ML | Refills: 0 | Status: SHIPPED | OUTPATIENT
Start: 2024-04-30 | End: 2024-05-07

## 2024-04-30 RX ORDER — OLOPATADINE HYDROCHLORIDE 1 MG/ML
1 SOLUTION/ DROPS OPHTHALMIC 2 TIMES DAILY
Qty: 5 ML | Refills: 0 | Status: SHIPPED | OUTPATIENT
Start: 2024-04-30

## 2024-04-30 ASSESSMENT — ENCOUNTER SYMPTOMS
VOMITING: 0
EYE REDNESS: 0
DIZZINESS: 0
HEADACHES: 0
NAUSEA: 0
SHORTNESS OF BREATH: 0
COUGH: 0
WHEEZING: 0
PHOTOPHOBIA: 0
BLURRED VISION: 0
CONSTIPATION: 0
DOUBLE VISION: 0
DIARRHEA: 0
EYE PAIN: 0
CHILLS: 0
FEVER: 0
EYE DISCHARGE: 0
ABDOMINAL PAIN: 0
PALPITATIONS: 0

## 2024-04-30 ASSESSMENT — FIBROSIS 4 INDEX: FIB4 SCORE: 0.88

## 2024-05-01 NOTE — PROGRESS NOTES
"Subjective     Jena Yepez is a 33 y.o. female who presents with Eye Problem (R eye problem, x2 days )            Jena is a 33 y.o. female who presents to urgent care with right eye irritation and itchiness.  Symptoms started 2 days ago.  No injury or trauma to the eye.  No change in vision, blurry vision or double vision. No photophobia. No discharge/drainage. Minimal eye redness. Patient states eye feels dry.  She has used OTC eyedrops which does provide temporary relief. Patient does not wear contacts.        Review of Systems   Constitutional:  Negative for chills, fever and malaise/fatigue.   Eyes:  Negative for blurred vision, double vision, photophobia, pain, discharge and redness.   Respiratory:  Negative for cough, shortness of breath and wheezing.    Cardiovascular:  Negative for chest pain and palpitations.   Gastrointestinal:  Negative for abdominal pain, constipation, diarrhea, nausea and vomiting.   Neurological:  Negative for dizziness and headaches.   All other systems reviewed and are negative.             Objective     /54 (BP Location: Right arm, Patient Position: Sitting, BP Cuff Size: Adult)   Pulse 97   Temp 36.9 °C (98.4 °F) (Temporal)   Resp 16   Ht 1.575 m (5' 2\")   Wt 65.8 kg (145 lb)   SpO2 98%   BMI 26.52 kg/m²      Physical Exam  Vitals reviewed.   Constitutional:       Appearance: Normal appearance.   HENT:      Head: Normocephalic and atraumatic.      Nose: Nose normal.      Mouth/Throat:      Mouth: Mucous membranes are moist.      Pharynx: Oropharynx is clear.   Eyes:      General:         Right eye: No discharge.         Left eye: No discharge.      Extraocular Movements: Extraocular movements intact.      Conjunctiva/sclera: Conjunctivae normal.      Pupils: Pupils are equal, round, and reactive to light.   Pulmonary:      Effort: Pulmonary effort is normal.      Breath sounds: Normal breath sounds.   Neurological:      Mental Status: She is " alert.                             Assessment & Plan          1. Acute conjunctivitis of right eye, unspecified acute conjunctivitis type  - polymixin-trimethoprim (POLYTRIM) 34320-1.1 UNIT/ML-% Solution; Administer 1 Drop into the right eye every 4 hours for 7 days.  Dispense: 10 mL; Refill: 0  - olopatadine (PATANOL) 0.1 % ophthalmic solution; Administer 1 Drop into both eyes 2 times a day. For allergic conjunctivitis  Dispense: 5 mL; Refill: 0       Differential diagnoses, supportive care measures and indications for immediate follow-up discussed with patient. Pathogenesis of diagnosis discussed including typical length and natural progression.      Instructed to return to urgent care or nearest emergency department if symptoms fail to improve, for any change in condition, further concerns, or new concerning symptoms.    Patient states understanding and agrees with the plan of care and discharge instructions.

## 2024-06-19 NOTE — ASSESSMENT & PLAN NOTE
Chronic, unstable. Denies symptoms. Completed antibiotics as ordered after routine screening for STI: doxycycline 100 mg BID for 7 days. Remaining sti panel was negative 1/31/23.   Will recheck with pap for effectiveness of treatment, and in 1 year for reinfection.      179.8

## 2024-07-25 DIAGNOSIS — Z87.19 HISTORY OF SMALL BOWEL OBSTRUCTION: ICD-10-CM

## 2024-08-29 ENCOUNTER — OFFICE VISIT (OUTPATIENT)
Dept: URGENT CARE | Facility: PHYSICIAN GROUP | Age: 34
End: 2024-08-29
Payer: COMMERCIAL

## 2024-08-29 ENCOUNTER — HOSPITAL ENCOUNTER (OUTPATIENT)
Dept: RADIOLOGY | Facility: MEDICAL CENTER | Age: 34
End: 2024-08-29
Attending: NURSE PRACTITIONER
Payer: COMMERCIAL

## 2024-08-29 VITALS
HEIGHT: 62 IN | RESPIRATION RATE: 19 BRPM | WEIGHT: 148.15 LBS | TEMPERATURE: 97.9 F | OXYGEN SATURATION: 100 % | HEART RATE: 91 BPM | DIASTOLIC BLOOD PRESSURE: 80 MMHG | BODY MASS INDEX: 27.26 KG/M2 | SYSTOLIC BLOOD PRESSURE: 124 MMHG

## 2024-08-29 DIAGNOSIS — R10.30 LOWER ABDOMINAL PAIN: ICD-10-CM

## 2024-08-29 DIAGNOSIS — R10.32 LLQ PAIN: ICD-10-CM

## 2024-08-29 PROCEDURE — 700117 HCHG RX CONTRAST REV CODE 255: Performed by: NURSE PRACTITIONER

## 2024-08-29 PROCEDURE — 74177 CT ABD & PELVIS W/CONTRAST: CPT

## 2024-08-29 RX ADMIN — IOHEXOL 100 ML: 350 INJECTION, SOLUTION INTRAVENOUS at 12:30

## 2024-08-29 ASSESSMENT — FIBROSIS 4 INDEX: FIB4 SCORE: 0.88
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
## 2024-10-08 ENCOUNTER — HOSPITAL ENCOUNTER (OUTPATIENT)
Dept: RADIOLOGY | Facility: MEDICAL CENTER | Age: 34
End: 2024-10-08
Attending: OBSTETRICS & GYNECOLOGY
Payer: COMMERCIAL

## 2024-10-08 DIAGNOSIS — R10.2 PELVIC PAIN SYNDROME: ICD-10-CM

## 2024-10-08 PROCEDURE — 76830 TRANSVAGINAL US NON-OB: CPT

## 2024-11-11 ENCOUNTER — OFFICE VISIT (OUTPATIENT)
Dept: MEDICAL GROUP | Facility: PHYSICIAN GROUP | Age: 34
End: 2024-11-11
Payer: COMMERCIAL

## 2024-11-11 VITALS
OXYGEN SATURATION: 96 % | WEIGHT: 159.4 LBS | DIASTOLIC BLOOD PRESSURE: 64 MMHG | HEART RATE: 97 BPM | HEIGHT: 62 IN | TEMPERATURE: 98.9 F | BODY MASS INDEX: 29.33 KG/M2 | SYSTOLIC BLOOD PRESSURE: 96 MMHG

## 2024-11-11 DIAGNOSIS — H66.002 NON-RECURRENT ACUTE SUPPURATIVE OTITIS MEDIA OF LEFT EAR WITHOUT SPONTANEOUS RUPTURE OF TYMPANIC MEMBRANE: ICD-10-CM

## 2024-11-11 DIAGNOSIS — H92.03 OTALGIA OF BOTH EARS: ICD-10-CM

## 2024-11-11 PROCEDURE — 3078F DIAST BP <80 MM HG: CPT | Performed by: NURSE PRACTITIONER

## 2024-11-11 PROCEDURE — 99213 OFFICE O/P EST LOW 20 MIN: CPT | Performed by: NURSE PRACTITIONER

## 2024-11-11 PROCEDURE — 3074F SYST BP LT 130 MM HG: CPT | Performed by: NURSE PRACTITIONER

## 2024-11-11 RX ORDER — ACETAMINOPHEN 500 MG
500-1000 TABLET ORAL 2 TIMES DAILY
COMMUNITY

## 2024-11-11 ASSESSMENT — ENCOUNTER SYMPTOMS
CHILLS: 0
COUGH: 0
EYES NEGATIVE: 1
DIAPHORESIS: 0
SORE THROAT: 1
FEVER: 0
SHORTNESS OF BREATH: 0
HEADACHES: 1
GASTROINTESTINAL NEGATIVE: 1

## 2024-11-11 ASSESSMENT — FIBROSIS 4 INDEX: FIB4 SCORE: 0.91

## 2024-11-11 NOTE — PROGRESS NOTES
"Verbal consent was acquired by the patient to use [address] [facility] [employer] ambient listening note generation during this visit Yes      Subjective   Naren Yepez is a 34 y.o. female who presents for ear pain.  History of Present Illness  The patient presents for evaluation of ear pain.    She reports experiencing pain in both ears, which initially started in the right ear on Friday and has since migrated to the left ear. The pain extends from her left ear down to her throat, causing discomfort when swallowing. She has been managing the pain with Tylenol, which she finds effective. She mentions that she felt as if there was water in her ear on Friday.    She recalls having a severe headache on Friday, which she attributes to caffeine withdrawal as she had reduced her coffee intake. The headache was alleviated after she took ibuprofen and increased her coffee consumption.    She reports no sinus congestion or pain, respiratory symptoms such as cough or difficulty breathing, chest pain, or unusual sweating. She has no history of ear infections. She admits to not drinking much water and only had coffee this morning. She has previously taken penicillin-type medications.    ALLERGIES  She has no known drug allergies.    Review of Systems   Constitutional:  Negative for chills, diaphoresis and fever.   HENT:  Positive for ear pain and sore throat. Negative for congestion.    Eyes: Negative.    Respiratory:  Negative for cough and shortness of breath.    Cardiovascular:  Negative for chest pain.   Gastrointestinal: Negative.    Neurological:  Positive for headaches.   All other systems reviewed and are negative.    Objective   BP 96/64 (BP Location: Left arm, Patient Position: Sitting, BP Cuff Size: Adult)   Pulse 97   Temp 37.2 °C (98.9 °F) (Temporal)   Ht 1.575 m (5' 2\")   Wt 72.3 kg (159 lb 6.4 oz)   SpO2 96%   Physical Exam  General: Normal appearing. No distress.  HEENT: Left purulent effusion filling the " middle ear, left bulging TM. Normocephalic. Eyes conjunctiva clear lids without ptosis, pupils equal and reactive to light accommodation, ears normal shape and contour, canals are clear bilaterally, nasal mucosa benign, oropharynx is without edema or exudates. Sinuses (frontal and maxillary) nontender to palpation. Right ear appears normal. Left side of the throat is red, but no exudates are present. Left tonsillar lymph node is enlarged and tender.  Pulmonary: Clear to ausculation.  Normal effort. No rales, rhonchi, or wheezing.  Cardiovascular: Regular rate and rhythm without murmur. Carotid and radial pulses are intact and equal bilaterally.  Neurologic: Grossly nonfocal.  Lymph: No cervical, supraclavicular or axillary lymph nodes are palpable.  Skin: Warm and dry.  No obvious lesions.  Musculoskeletal: Normal gait. No extremity cyanosis, clubbing, or edema.  Psych: Normal mood and affect. Alert and oriented x3. Judgment and insight is normal.     Assessment & Plan  1. Non-recurrent acute suppurative otitis media of left ear without spontaneous rupture of tympanic membrane  2. Otalgia of both ears  New to examiner. The patient reports ear pain that started on the right side and migrated to the left, with associated throat pain on the left side. Examination revealed redness on the left side of the throat and enlarged, tender left tonsillar lymph nodes. There were no signs of sinus congestion, sinus pain, or cough. Augmentin 875-125 mg was prescribed, to be taken 1 pill twice a day for 7 days. She was advised to complete the full course, even if symptoms improve. The medication should be taken with food, and she was encouraged to maintain adequate hydration. She was also advised to take Tylenol as needed for pain and to alternate with ibuprofen if necessary. Potential side effects, including gastrointestinal upset and diarrhea, were discussed, and she was advised to take the medication with yogurt or a probiotic  to mitigate these effects.  - amoxicillin-clavulanate (AUGMENTIN) 875-125 MG Tab; Take 1 Tablet by mouth 2 times a day for 7 days.  Dispense: 14 Tablet; Refill: 0     Return if symptoms worsen or fail to improve.     Please note that this dictation was created using voice recognition software. I have made every reasonable attempt to correct obvious errors, but I expect that there are errors of grammar and possibly content that I did not discover before finalizing the note.

## 2024-12-24 ENCOUNTER — APPOINTMENT (OUTPATIENT)
Dept: RADIOLOGY | Facility: MEDICAL CENTER | Age: 34
End: 2024-12-24
Attending: OBSTETRICS & GYNECOLOGY
Payer: COMMERCIAL

## 2025-02-13 DIAGNOSIS — Z13.0 SCREENING FOR DEFICIENCY ANEMIA: ICD-10-CM

## 2025-02-13 DIAGNOSIS — E78.00 PURE HYPERCHOLESTEROLEMIA: ICD-10-CM

## 2025-02-13 DIAGNOSIS — Z13.6 SCREENING FOR CARDIOVASCULAR CONDITION: ICD-10-CM

## 2025-02-13 DIAGNOSIS — E55.9 VITAMIN D DEFICIENCY: ICD-10-CM

## 2025-02-13 DIAGNOSIS — Z13.29 THYROID DISORDER SCREEN: ICD-10-CM

## 2025-03-31 ENCOUNTER — HOSPITAL ENCOUNTER (OUTPATIENT)
Dept: LAB | Facility: MEDICAL CENTER | Age: 35
End: 2025-03-31
Payer: COMMERCIAL

## 2025-03-31 DIAGNOSIS — Z13.6 SCREENING FOR CARDIOVASCULAR CONDITION: ICD-10-CM

## 2025-03-31 DIAGNOSIS — Z13.0 SCREENING FOR DEFICIENCY ANEMIA: ICD-10-CM

## 2025-03-31 DIAGNOSIS — E55.9 VITAMIN D DEFICIENCY: ICD-10-CM

## 2025-03-31 DIAGNOSIS — Z13.29 THYROID DISORDER SCREEN: ICD-10-CM

## 2025-03-31 LAB
25(OH)D3 SERPL-MCNC: 18 NG/ML (ref 30–100)
ALBUMIN SERPL BCP-MCNC: 4 G/DL (ref 3.2–4.9)
ALBUMIN/GLOB SERPL: 1.4 G/DL
ALP SERPL-CCNC: 60 U/L (ref 30–99)
ALT SERPL-CCNC: 16 U/L (ref 2–50)
ANION GAP SERPL CALC-SCNC: 9 MMOL/L (ref 7–16)
AST SERPL-CCNC: 18 U/L (ref 12–45)
BILIRUB SERPL-MCNC: 0.6 MG/DL (ref 0.1–1.5)
BUN SERPL-MCNC: 9 MG/DL (ref 8–22)
CALCIUM ALBUM COR SERPL-MCNC: 8.7 MG/DL (ref 8.5–10.5)
CALCIUM SERPL-MCNC: 8.7 MG/DL (ref 8.5–10.5)
CHLORIDE SERPL-SCNC: 108 MMOL/L (ref 96–112)
CHOLEST SERPL-MCNC: 156 MG/DL (ref 100–199)
CO2 SERPL-SCNC: 22 MMOL/L (ref 20–33)
CREAT SERPL-MCNC: 0.65 MG/DL (ref 0.5–1.4)
ERYTHROCYTE [DISTWIDTH] IN BLOOD BY AUTOMATED COUNT: 43.3 FL (ref 35.9–50)
FASTING STATUS PATIENT QL REPORTED: NORMAL
GFR SERPLBLD CREATININE-BSD FMLA CKD-EPI: 117 ML/MIN/1.73 M 2
GLOBULIN SER CALC-MCNC: 2.9 G/DL (ref 1.9–3.5)
GLUCOSE SERPL-MCNC: 96 MG/DL (ref 65–99)
HCT VFR BLD AUTO: 40.9 % (ref 37–47)
HDLC SERPL-MCNC: 47 MG/DL
HGB BLD-MCNC: 13.2 G/DL (ref 12–16)
LDLC SERPL CALC-MCNC: 88 MG/DL
MCH RBC QN AUTO: 29.1 PG (ref 27–33)
MCHC RBC AUTO-ENTMCNC: 32.3 G/DL (ref 32.2–35.5)
MCV RBC AUTO: 90.1 FL (ref 81.4–97.8)
PLATELET # BLD AUTO: 221 K/UL (ref 164–446)
PMV BLD AUTO: 10 FL (ref 9–12.9)
POTASSIUM SERPL-SCNC: 4.1 MMOL/L (ref 3.6–5.5)
PROT SERPL-MCNC: 6.9 G/DL (ref 6–8.2)
RBC # BLD AUTO: 4.54 M/UL (ref 4.2–5.4)
SODIUM SERPL-SCNC: 139 MMOL/L (ref 135–145)
TRIGL SERPL-MCNC: 105 MG/DL (ref 0–149)
TSH SERPL-ACNC: 0.27 UIU/ML (ref 0.35–5.5)
WBC # BLD AUTO: 7.6 K/UL (ref 4.8–10.8)

## 2025-03-31 PROCEDURE — 80053 COMPREHEN METABOLIC PANEL: CPT

## 2025-03-31 PROCEDURE — 82306 VITAMIN D 25 HYDROXY: CPT

## 2025-03-31 PROCEDURE — 85027 COMPLETE CBC AUTOMATED: CPT

## 2025-03-31 PROCEDURE — 36415 COLL VENOUS BLD VENIPUNCTURE: CPT

## 2025-03-31 PROCEDURE — 80061 LIPID PANEL: CPT

## 2025-03-31 PROCEDURE — 84443 ASSAY THYROID STIM HORMONE: CPT

## 2025-04-01 ENCOUNTER — RESULTS FOLLOW-UP (OUTPATIENT)
Dept: MEDICAL GROUP | Facility: PHYSICIAN GROUP | Age: 35
End: 2025-04-01

## 2025-04-03 ENCOUNTER — APPOINTMENT (OUTPATIENT)
Dept: MEDICAL GROUP | Facility: PHYSICIAN GROUP | Age: 35
End: 2025-04-03
Payer: COMMERCIAL

## 2025-04-03 ENCOUNTER — HOSPITAL ENCOUNTER (OUTPATIENT)
Dept: LAB | Facility: MEDICAL CENTER | Age: 35
End: 2025-04-03
Payer: COMMERCIAL

## 2025-04-03 VITALS
OXYGEN SATURATION: 98 % | TEMPERATURE: 97.8 F | DIASTOLIC BLOOD PRESSURE: 62 MMHG | RESPIRATION RATE: 20 BRPM | HEIGHT: 62 IN | HEART RATE: 98 BPM | SYSTOLIC BLOOD PRESSURE: 102 MMHG | WEIGHT: 149 LBS | BODY MASS INDEX: 27.42 KG/M2

## 2025-04-03 DIAGNOSIS — R79.89 LOW TSH LEVEL: ICD-10-CM

## 2025-04-03 DIAGNOSIS — E55.9 VITAMIN D DEFICIENCY: ICD-10-CM

## 2025-04-03 DIAGNOSIS — Z23 NEED FOR VACCINATION: ICD-10-CM

## 2025-04-03 DIAGNOSIS — Z00.00 ANNUAL PHYSICAL EXAM: ICD-10-CM

## 2025-04-03 DIAGNOSIS — J30.1 SEASONAL ALLERGIC RHINITIS DUE TO POLLEN: ICD-10-CM

## 2025-04-03 LAB
T3 SERPL-MCNC: 141 NG/DL (ref 60–181)
T4 FREE SERPL-MCNC: 1.44 NG/DL (ref 0.93–1.7)
TSH SERPL-ACNC: 0.34 UIU/ML (ref 0.38–5.33)

## 2025-04-03 PROCEDURE — 99395 PREV VISIT EST AGE 18-39: CPT | Mod: 25

## 2025-04-03 PROCEDURE — 90715 TDAP VACCINE 7 YRS/> IM: CPT

## 2025-04-03 PROCEDURE — 90746 HEPB VACCINE 3 DOSE ADULT IM: CPT

## 2025-04-03 PROCEDURE — 84439 ASSAY OF FREE THYROXINE: CPT

## 2025-04-03 PROCEDURE — 90472 IMMUNIZATION ADMIN EACH ADD: CPT

## 2025-04-03 PROCEDURE — 3078F DIAST BP <80 MM HG: CPT

## 2025-04-03 PROCEDURE — 3074F SYST BP LT 130 MM HG: CPT

## 2025-04-03 PROCEDURE — 36415 COLL VENOUS BLD VENIPUNCTURE: CPT

## 2025-04-03 PROCEDURE — 84480 ASSAY TRIIODOTHYRONINE (T3): CPT

## 2025-04-03 PROCEDURE — 90471 IMMUNIZATION ADMIN: CPT

## 2025-04-03 PROCEDURE — 84443 ASSAY THYROID STIM HORMONE: CPT

## 2025-04-03 RX ORDER — AZELASTINE 1 MG/ML
1 SPRAY, METERED NASAL 2 TIMES DAILY
Qty: 30 ML | Refills: 1 | Status: SHIPPED | OUTPATIENT
Start: 2025-04-03

## 2025-04-03 RX ORDER — METRONIDAZOLE 500 MG/1
TABLET ORAL
COMMUNITY
Start: 2025-03-27

## 2025-04-03 ASSESSMENT — FIBROSIS 4 INDEX: FIB4 SCORE: 0.73

## 2025-04-03 ASSESSMENT — PATIENT HEALTH QUESTIONNAIRE - PHQ9: CLINICAL INTERPRETATION OF PHQ2 SCORE: 0

## 2025-04-03 NOTE — PATIENT INSTRUCTIONS
Follow-Up Instructions:  - Follow up in 1 year or sooner if necessary.  - Reevaluation of thyroid function and additional labs ordered.  - Take vitamin D3 with K2 supplements regularly.  - Use tolnaftate cream for athlete's foot and wear wool or cotton socks to prevent moisture trapping.  - Use azelastine nasal spray and Flonase for congestion.

## 2025-04-03 NOTE — PROGRESS NOTES
Verbal consent was acquired by the patient to use [employer] ambient listening note generation during this visit     Subjective:     CC: Diagnoses of Need for vaccination, Vitamin D deficiency, Low TSH level, Seasonal allergic rhinitis due to pollen, and Annual physical exam were pertinent to this visit.    HPI:   Jena presents today with    History of Present Illness  The patient is a 34-year-old female presenting for an annual wellness visit.    Fatigue  - Reports fatigue due to menstrual cramps disrupting her sleep, typically during the first two days of her cycle.  - Currently on metronidazole and has not completed the course.  - No concerns about sexually transmitted infections.    Foot Issues  - Intermittent itching and flaking on the bottom of her right toe, alternating between severe dryness and itchiness.  - Previous treatments with spray and cream were ineffective.  - Her mother had similar foot issues.    Allergies  - Suffers from allergies, using OTC medication for sneezing but not congestion.  - Nasal sprays dry her nose.  - An air purifier did not help.    Supplemental information: She maintains a balanced diet, exercises weekly, and reports no substance abuse. She feels safe in her relationship and adheres to safety measures. Bowel movements are now normal after previous difficulty. She has spider scars from surgery affecting her digestive tract. She is not taking vitamin D or other supplements. Previously took multivitamin gummies but discontinued. Her last Pap smear in 02/2025 with Dr. Morgan was abnormal but not concerning. She reported extra discharge and was given medicine starting with A.    SOCIAL HISTORY  She does not smoke. She has 2 children aged 16 and 11.    FAMILY HISTORY  Her mother had issues with her feet a couple of years back.    ALLERGIES  The patient has no known allergies.    MEDICATIONS  Current: Metronidazole.  Past: Multivitamin gummy.    IMMUNIZATIONS  She is due for  "hepatitis B vaccine and tetanus vaccine.    Current Outpatient Medications   Medication Sig Dispense Refill    metroNIDAZOLE (FLAGYL) 500 MG Tab TAKE 1 TABLET BY MOUTH TWICE DAILY FOR 7 DAYS. NO ALCOHOL WITH THIS MEDICATION      azelastine (ASTELIN) 137 MCG/SPRAY nasal spray Administer 1 Spray into affected nostril(S) 2 times a day. 30 mL 1     No current facility-administered medications for this visit.       Problem   Vitamin D Deficiency   Annual Physical Exam     Health Maintenance: Completed  Cancer screening:   Cervical cancer screenin2025 with GYN    Infectious disease screening/Immunizaitons  -STI screening: declines  Practices safe sex.    -Immunizaitons:   Influenza: recommend annually  Tetanus: up-to-date: 4/3/25  Hep B 4/3/25  Anticipatory Guidance:  Elicits she is eating a variety of fresh veggies/fruits, lean meats,   limited fast food/junk food/soda  Exercise: recommended 150 minutes/week: once weekly 1 hour  Substance Abuse: no  Safe in relationship. Yes/fiance  Seat belts, bike helmet, gun safety discussed.  Sun protection used. Dental home established    ROS:  Review of Systems   Skin:  Positive for rash (itchy rash to bottom of right foot).   All other systems reviewed and are negative.      Objective:     Exam:  /62 (BP Location: Left arm, Patient Position: Sitting, BP Cuff Size: Adult)   Pulse 98   Temp 36.6 °C (97.8 °F) (Temporal)   Resp 20   Ht 1.575 m (5' 2\")   Wt 67.6 kg (149 lb)   SpO2 98%   BMI 27.25 kg/m²  Body mass index is 27.25 kg/m².        Physical Exam  Vitals reviewed.   Constitutional:       General: She is not in acute distress.     Appearance: Normal appearance. She is not ill-appearing.   HENT:      Head: Normocephalic and atraumatic.      Right Ear: Tympanic membrane, ear canal and external ear normal.      Left Ear: Tympanic membrane, ear canal and external ear normal.      Nose: Congestion present.   Eyes:      Pupils: Pupils are equal, round, and reactive " to light.   Cardiovascular:      Rate and Rhythm: Normal rate and regular rhythm.      Pulses: Normal pulses.      Heart sounds: Normal heart sounds.   Pulmonary:      Effort: Pulmonary effort is normal. No respiratory distress.      Breath sounds: Normal breath sounds.   Abdominal:      General: Bowel sounds are normal.      Palpations: Abdomen is soft.   Skin:     General: Skin is warm and dry.      Findings: No rash.   Neurological:      General: No focal deficit present.      Mental Status: She is alert and oriented to person, place, and time.   Psychiatric:         Mood and Affect: Mood normal.         Behavior: Behavior normal.         Labs:    Latest Reference Range & Units 03/31/25 08:39   WBC 4.8 - 10.8 K/uL 7.6   RBC 4.20 - 5.40 M/uL 4.54   Hemoglobin 12.0 - 16.0 g/dL 13.2   Hematocrit 37.0 - 47.0 % 40.9   MCV 81.4 - 97.8 fL 90.1   MCH 27.0 - 33.0 pg 29.1   MCHC 32.2 - 35.5 g/dL 32.3   RDW 35.9 - 50.0 fL 43.3   Platelet Count 164 - 446 K/uL 221   MPV 9.0 - 12.9 fL 10.0   Sodium 135 - 145 mmol/L 139   Potassium 3.6 - 5.5 mmol/L 4.1   Chloride 96 - 112 mmol/L 108   Co2 20 - 33 mmol/L 22   Anion Gap 7.0 - 16.0  9.0   Glucose 65 - 99 mg/dL 96   Bun 8 - 22 mg/dL 9   Creatinine 0.50 - 1.40 mg/dL 0.65   GFR (CKD-EPI) >60 mL/min/1.73 m 2 117   Calcium 8.5 - 10.5 mg/dL 8.7   Correct Calcium 8.5 - 10.5 mg/dL 8.7   AST(SGOT) 12 - 45 U/L 18   ALT(SGPT) 2 - 50 U/L 16   Alkaline Phosphatase 30 - 99 U/L 60   Total Bilirubin 0.1 - 1.5 mg/dL 0.6   Albumin 3.2 - 4.9 g/dL 4.0   Total Protein 6.0 - 8.2 g/dL 6.9   Globulin 1.9 - 3.5 g/dL 2.9   A-G Ratio g/dL 1.4   Fasting Status  Fasting   Cholesterol,Tot 100 - 199 mg/dL 156   Triglycerides 0 - 149 mg/dL 105   HDL >=40 mg/dL 47   LDL <100 mg/dL 88   25-Hydroxy   Vitamin D 25 30 - 100 ng/mL 18 (L)   TSH 0.350 - 5.500 uIU/mL 0.265 (L)   (L): Data is abnormally low    Assessment & Plan:     34 y.o. female with the following -     Assessment & Plan  1. Health maintenance: TSH  levels slightly low, indicating potential hyperthyroidism. Vitamin D levels consistently low, requiring supplementation.  - Reevaluation of thyroid function and additional labs ordered.  - Advised to take vitamin D3 with K2 supplements.  - Hepatitis B and tetanus vaccines to be administered today.  - Advised adequate hydration and fiber intake for regular bowel movements.    2. Athlete's foot: Condition consistent with athlete's foot.  - Tolnaftate cream prescribed.  - Advised wool or cotton socks to prevent moisture trapping.    3. Allergies:   - Azelastine nasal spray prescribed for congestion, to be used with Flonase.    4. Abnormal Pap smear: Last Pap smear in 02/2025 with Dr. Morgan was abnormal but not concerning.  - Records requested from Dr. Morgan.    Follow-up in 1 year or sooner if necessary.    Problem List Items Addressed This Visit          Family Medicine Problems    Vitamin D deficiency    Chronic, unstable. Recommend once daily vit d3 otc             Other    Seasonal allergic rhinitis due to pollen    Relevant Medications    azelastine (ASTELIN) 137 MCG/SPRAY nasal spray    Annual physical exam    Reports generally feeling well. Has small rash to foot, nasal congestion secondary to allergy          Other Visit Diagnoses         Need for vaccination        Relevant Orders    TDAP VACCINE =>6YO IM    Hepatitis B Vaccine Adult IM      Low TSH level        Relevant Orders    FREE THYROXINE    TRIIDOTHYRONINE    TSH          Patient was educated in proper administration of medication(s) ordered today including safety, possible SE, risks, benefits, rationale and alternatives to therapy.   Supportive care, differential diagnoses, and indications for immediate follow-up discussed with patient.    Pathogenesis of diagnosis discussed including typical length and natural progression.    Instructed to return to clinic or nearest emergency department for any change in condition, further concerns, or worsening  of symptoms.  Patient states understanding of the plan of care and discharge instructions.    Return in about 1 year (around 4/3/2026) for wellness.    Please note that this dictation was created using voice recognition software. I have made every reasonable attempt to correct obvious errors, but I expect that there are errors of grammar and possibly content that I did not discover before finalizing the note.

## 2025-04-04 ENCOUNTER — RESULTS FOLLOW-UP (OUTPATIENT)
Dept: MEDICAL GROUP | Facility: PHYSICIAN GROUP | Age: 35
End: 2025-04-04

## 2025-04-04 DIAGNOSIS — R79.89 LOW TSH LEVEL: ICD-10-CM

## 2025-04-05 ENCOUNTER — HOSPITAL ENCOUNTER (OUTPATIENT)
Dept: LAB | Facility: MEDICAL CENTER | Age: 35
End: 2025-04-05
Payer: COMMERCIAL

## 2025-04-05 DIAGNOSIS — R79.89 LOW TSH LEVEL: ICD-10-CM

## 2025-04-05 PROCEDURE — 83520 IMMUNOASSAY QUANT NOS NONAB: CPT

## 2025-04-05 PROCEDURE — 84445 ASSAY OF TSI GLOBULIN: CPT

## 2025-04-05 PROCEDURE — 36415 COLL VENOUS BLD VENIPUNCTURE: CPT

## 2025-04-07 LAB — TSH RECEP AB SER-ACNC: <1.1 IU/L

## 2025-04-08 LAB — TSI SER-ACNC: <0.1 IU/L

## 2025-04-09 ENCOUNTER — RESULTS FOLLOW-UP (OUTPATIENT)
Dept: MEDICAL GROUP | Facility: PHYSICIAN GROUP | Age: 35
End: 2025-04-09

## 2025-04-09 DIAGNOSIS — R79.89 LOW TSH LEVEL: ICD-10-CM

## 2025-05-09 ENCOUNTER — HOSPITAL ENCOUNTER (OUTPATIENT)
Dept: RADIOLOGY | Facility: MEDICAL CENTER | Age: 35
End: 2025-05-09
Payer: COMMERCIAL

## 2025-05-09 DIAGNOSIS — R79.89 LOW TSH LEVEL: ICD-10-CM

## 2025-05-09 PROCEDURE — 76536 US EXAM OF HEAD AND NECK: CPT

## 2025-05-13 ENCOUNTER — RESULTS FOLLOW-UP (OUTPATIENT)
Dept: MEDICAL GROUP | Facility: PHYSICIAN GROUP | Age: 35
End: 2025-05-13